# Patient Record
Sex: MALE | Race: WHITE | NOT HISPANIC OR LATINO | Employment: UNEMPLOYED | URBAN - METROPOLITAN AREA
[De-identification: names, ages, dates, MRNs, and addresses within clinical notes are randomized per-mention and may not be internally consistent; named-entity substitution may affect disease eponyms.]

---

## 2017-03-02 RX ORDER — ZOLPIDEM TARTRATE 5 MG/1
TABLET ORAL
Refills: 1 | OUTPATIENT
Start: 2017-03-02

## 2017-05-09 ENCOUNTER — OFFICE VISIT (OUTPATIENT)
Dept: NEUROLOGY | Facility: MEDICAL CENTER | Age: 47
End: 2017-05-09
Payer: MEDICAID

## 2017-05-09 VITALS
OXYGEN SATURATION: 98 % | WEIGHT: 165 LBS | DIASTOLIC BLOOD PRESSURE: 70 MMHG | TEMPERATURE: 99 F | HEART RATE: 76 BPM | HEIGHT: 73 IN | RESPIRATION RATE: 16 BRPM | BODY MASS INDEX: 21.87 KG/M2 | SYSTOLIC BLOOD PRESSURE: 96 MMHG

## 2017-05-09 DIAGNOSIS — G35 MULTIPLE SCLEROSIS (HCC): ICD-10-CM

## 2017-05-09 DIAGNOSIS — F51.01 PRIMARY INSOMNIA: ICD-10-CM

## 2017-05-09 DIAGNOSIS — E53.8 VITAMIN B12 DEFICIENCY: ICD-10-CM

## 2017-05-09 DIAGNOSIS — E55.9 VITAMIN D DEFICIENCY: ICD-10-CM

## 2017-05-09 PROCEDURE — 99214 OFFICE O/P EST MOD 30 MIN: CPT | Performed by: PSYCHIATRY & NEUROLOGY

## 2017-05-09 RX ORDER — ZOLPIDEM TARTRATE 10 MG/1
10 TABLET ORAL NIGHTLY PRN
Qty: 30 TAB | Refills: 5 | Status: SHIPPED | OUTPATIENT
Start: 2017-05-09 | End: 2019-07-30

## 2017-05-09 NOTE — MR AVS SNAPSHOT
"        Ross Lindsay   2017 11:20 AM   Office Visit   MRN: [id number]    Department:  [facility]   Dept Phone:  [phone]    Description:  Male : 1970   Provider:  Melissa P Bloch, M.D.           Reason for Visit     Multiple Sclerosis FU      Allergies as of 2017     No Known Allergies      You were diagnosed with     Multiple sclerosis (CMS-HCC)   [340.ICD-9-CM]       Primary insomnia   [306436]       Vitamin B12 deficiency   [398397]       Vitamin D deficiency   [9651765]         Vital Signs     Blood Pressure Pulse Temperature Respirations Height Weight    96/70 mmHg 76 37.2 °C (99 °F) 16 1.854 m (6' 0.99\") 74.844 kg (165 lb)    Body Mass Index Oxygen Saturation Smoking Status             21.77 kg/m2 98% Former Smoker         Basic Information     Date Of Birth Sex Race Ethnicity Preferred Language    1970 Male White Non- English      Your appointments     May 24, 2017  9:30 AM   IV BIOLOGIC with [facility] INF CHAIR 2   [facility] (--)    Outpatient Infusion Center  [address] [address]   [phone]            2017  1:20 PM   Follow Up Visit with Melissa P Bloch, M.D.   [facility] (--)    [address] [place] [address], [address] [address] [phone]           You will be receiving a confirmation call a few days before your appointment from our automated call confirmation system.              Problem List              ICD-10-CM Priority Class Noted - Resolved    Essential hypertension I10   3/27/2014 - Present    Multiple sclerosis (CMS-HCC) G35   2015 - Present    Vitamin D deficiency E55.9   2015 - Present    Vitamin B12 deficiency E53.8   2015 - Present    Insomnia G47.00   2015 - Present      Health Maintenance        Date Due Completion Dates    IMM DTaP/Tdap/Td Vaccine (2 - Td) 10/23/2024 10/23/2014            Current Immunizations     Influenza TIV (IM) 10/23/2014    Tdap " Vaccine 10/23/2014      Below and/or attached are the medications your provider expects you to take. Review all of your home medications and newly ordered medications with your provider and/or pharmacist. Follow medication instructions as directed by your provider and/or pharmacist. Please keep your medication list with you and share with your provider. Update the information when medications are discontinued, doses are changed, or new medications (including over-the-counter products) are added; and carry medication information at all times in the event of emergency situations     Allergies:  No Known Allergies          Medications  Valid as of: May 09, 2017 - 12:24 PM    Generic Name Brand Name Tablet Size Instructions for use    Citalopram Hydrobromide (Tab) CELEXA 20 MG Take 1 Tab by mouth every day.        Cyanocobalamin (Tab) vitamin B-12 1000 MCG TAKE ONE TABLET BY MOUTH DAILY        Dalfampridine (TABLET SR 12 HR) Dalfampridine 10 MG Take 10 mg by mouth 2 Times a Day.        Ergocalciferol (Cap) DRISDOL 94287 UNITS TAKE 1 CAPSULE BY MOUTH EVERY 7 DAYS        Ibuprofen (Tab) MOTRIN 200 MG Take 200 mg by mouth every 6 hours as needed.        Lisinopril (Tab) PRINIVIL 10 MG TAKE ONE TABLET BY MOUTH DAILY        Natalizumab (Conc) TYSABRI 300 MG/15ML 300 mg by Intravenous route. Indications: Relapsing, Remitting Multiple Sclerosis        Zolpidem Tartrate (Tab) AMBIEN 10 MG Take 1 Tab by mouth at bedtime as needed for Sleep.        .                 Medicines prescribed today were sent to:     [facility] #[id number] - [place], [place] - [address] [facility] [address]    [address] [facility] [address]    Phone: [phone] Fax: [phone]    Open 24 Hours?: No      Medication refill instructions:       If your prescription bottle indicates you have medication refills left, it is not necessary to call your provider’s office. Please contact your pharmacy and they will refill your medication.    If your prescription bottle  indicates you do not have any refills left, you may request refills at any time through one of the following ways: The online [employer] system (except Urgent Care), by calling your provider’s office, or by asking your pharmacy to contact your provider’s office with a refill request. Medication refills are processed only during regular business hours and may not be available until the next business day. Your provider may request additional information or to have a follow-up visit with you prior to refilling your medication.   *Please Note: Medication refills are assigned a new Rx number when refilled electronically. Your pharmacy may indicate that no refills were authorized even though a new prescription for the same medication is available at the pharmacy. Please request the medicine by name with the pharmacy before contacting your provider for a refill.           MyChart Status: Patient Declined

## 2017-05-09 NOTE — ASSESSMENT & PLAN NOTE
Pt notices that he feels better on his pedal assist bike with his balance. Pt states that he has some insomnia. Pt states that he experiences some insomnia and some fatigue. Pt has to force himself to move every day. Pt states that the morning is more difficult because of stiffness. Pt has some tremors toward the end of the month. Pt states he feels like he ready to get his inf[place] [facility]. Pt grew up in [place].

## 2017-05-09 NOTE — PROGRESS NOTES
Established Patient    Ross presents today with the following:    CC: Follow up visit    HPI:   , 46 year old male with past medical history significant for multiple sclerosis presents to the clinic for a follow up visit. He was last seen in the clinic in 8/16. Reports to be having occasional tremors when he is tired, which goes away by itself. Reports to be having mild episodes of confusion occasionally, denies short-term or long-term memory problems. Currently on tysabri , monthly infusions - last one on 4/24/17. Also takes Ampyra. Reports to be doing well otherwise. Has been taking Vitamin D and Vitamin B12 supplements regularly. Uses a cane to ambulate.    Patient Active Problem List    Diagnosis Date Noted   • Multiple sclerosis (CMS-[facility]) 09/08/2015   • Vitamin D deficiency 09/08/2015   • Vitamin B12 deficiency 09/08/2015   • Insomnia 09/08/2015   • Essential hypertension 03/27/2014       Current Outpatient Prescriptions   Medication Sig Dispense Refill   • zolpidem (AMBIEN) 10 MG Tab Take 1 Tab by mouth at bedtime as needed for Sleep. 30 Tab 5   • lisinopril (PRINIVIL) 10 MG Tab TAKE ONE TABLET BY MOUTH DAILY 90 Tab 2   • Cyanocobalamin (VITAMIN B-12) 1000 MCG Tab TAKE ONE TABLET BY MOUTH DAILY 100 Tab 6   • vitamin D, Ergocalciferol, (DRISDOL) 00443 UNITS Cap capsule TAKE 1 CAPSULE BY MOUTH EVERY 7 DAYS 4 Cap 10   • citalopram (CELEXA) 20 MG Tab Take 1 Tab by mouth every day. 90 Tab 3   • natalizumab (TYSABRI) 300 MG/15ML Conc 300 mg by Intravenous route. Indications: Relapsing, Remitting Multiple Sclerosis     • Dalfampridine (AMPYRA) 10 MG TABLET SR 12 HR Take 10 mg by mouth 2 Times a Day. 60 Tab 11   • ibuprofen (MOTRIN) 200 MG TABS Take 200 mg by mouth every 6 hours as needed.       No current facility-administered medications for this visit.       ROS: As per HPI. Additional pertinent symptoms as noted below.    All others negative    BP 96/70 mmHg  Pulse 76  Temp(Src) 37.2 °C (99 °F)   "Resp 16  Ht 1.854 m (6' 0.99\")  Wt 74.844 kg (165 lb)  BMI 21.77 kg/m2  SpO2 98%    Physical Exam   Constitutional:  oriented to person, place, and time. No distress.   Eyes: Pupils are equal, round, and reactive to light. No scleral icterus.  Neck: Neck supple. No thyromegaly present.   Cardiovascular: Normal rate, regular rhythm and normal heart sounds.  Exam reveals no gallop and no friction rub.  No murmur heard.  Pulmonary/Chest: Breath sounds normal. Chest wall is not dull to percussion.   Musculoskeletal:   no edema.   Lymphadenopathy: no cervical adenopathy  Neurological: alert and oriented to person, place, and time. cranial nerves II through XII intact, motor exam normal,sensory exam normal, deep tendon reflexes are 2+ and broad-based gait.  Skin: No cyanosis. Nails show no clubbing.      Assessment and Plan    Multiple sclerosis (CMS-[facility])  - Plan is to continue Tysabri and Ampyra as he is doing better.    Vitamin B12 deficiency  - continue taking supplements.    Vitamin D deficiency  - continue taking supplements.      Signed by: Alexia Steele M.D.    "

## 2017-06-22 ENCOUNTER — TELEPHONE (OUTPATIENT)
Dept: NEUROLOGY | Facility: MEDICAL CENTER | Age: 47
End: 2017-06-22

## 2017-06-22 DIAGNOSIS — G35 MULTIPLE SCLEROSIS (HCC): ICD-10-CM

## 2017-06-22 NOTE — TELEPHONE ENCOUNTER
Message: received a call from Abigail with [facility] infusion that they are not going to infuse patient with tysabri. Patient has been complaining of balance issues, extreme fatigue, dull headache that every go away, occassional tremor. Patient does say the heat does bother him. I advised patient to look into getting a cooling vest. [facility] would like the OK from Dr Bloch to proceed with the Tysabri    Caller: Abigail  Call Back #: [phone]  OK to leave detailed message: n

## 2017-06-22 NOTE — TELEPHONE ENCOUNTER
I would like patient to have MRI scan of the brain as he has not had one in some time prior to the Tysabri if possible. Thanks MB

## 2017-06-22 NOTE — TELEPHONE ENCOUNTER
RE: Bj Robledo  Received: Today       Melissa P Bloch, M.D.  Geraldo Mcdonald R.N.       Cc: Ronni Beltran Ass't                     I have ordered an MRI of the Brain which I would like to get ASAP and before he has the next infusion. Please let patient know and if he is feeling worse over the weekend he should go to the ER for evaluation. Thanks MB

## 2017-06-29 ENCOUNTER — APPOINTMENT (OUTPATIENT)
Dept: RADIOLOGY | Facility: MEDICAL CENTER | Age: 47
End: 2017-06-29
Attending: PSYCHIATRY & NEUROLOGY
Payer: MEDICAID

## 2017-07-07 ENCOUNTER — HOSPITAL ENCOUNTER (OUTPATIENT)
Dept: RADIOLOGY | Facility: MEDICAL CENTER | Age: 47
End: 2017-07-07
Attending: PSYCHIATRY & NEUROLOGY
Payer: MEDICAID

## 2017-07-07 DIAGNOSIS — G35 MULTIPLE SCLEROSIS (HCC): ICD-10-CM

## 2017-07-07 PROCEDURE — 700117 HCHG RX CONTRAST REV CODE 255: Performed by: PSYCHIATRY & NEUROLOGY

## 2017-07-07 PROCEDURE — 70553 MRI BRAIN STEM W/O & W/DYE: CPT

## 2017-07-07 PROCEDURE — A9579 GAD-BASE MR CONTRAST NOS,1ML: HCPCS | Performed by: PSYCHIATRY & NEUROLOGY

## 2017-07-07 RX ADMIN — GADODIAMIDE 17 ML: 287 INJECTION INTRAVENOUS at 15:23

## 2017-07-27 ENCOUNTER — TELEPHONE (OUTPATIENT)
Dept: NEUROLOGY | Facility: MEDICAL CENTER | Age: 47
End: 2017-07-27

## 2017-07-27 NOTE — TELEPHONE ENCOUNTER
Message: patient would like MRI results and if he can continue tysabri.    Caller: krupa  Call Back #: [phone]  OK to leave detailed message: n

## 2017-07-27 NOTE — TELEPHONE ENCOUNTER
Message: patient had the MRI done. Would you like him to resume tysabri? His last infusion date was 5/24/17.    Caller: Patty  Call Back #: [phone]  OK to leave detailed message: n

## 2017-08-01 NOTE — TELEPHONE ENCOUNTER
Ross Higueraelson   7/27/2017   Telephone   MRN:  [id number]    Description: 46 year old male   Provider: Sigrid Stroud, Med Ass't   Department: [facility]         Reason for Call      Misc. Infusion     Tysabri           Call Documentation      Melissa P Bloch, M.D. at 7/28/2017  4:06 PM      Status: Signed         Expand All Collapse All    Ye Mri is stable and pat can resume tysabri ASAP . THX MB                   Sigrid Stroud, Med Ass't at 7/27/2017  4:05 PM      Status: Signed         Expand All Collapse All    Message: patient had the MRI done. Would you like him to resume tysabri? His last infusion date was 5/24/17.    Caller: Patty  Call Back #: [phone]  OK to leave detailed message: n                    Spoke w/ Infusion center and advised them. They will get Ross scheduled to start Tysabri again.

## 2017-11-07 ENCOUNTER — OFFICE VISIT (OUTPATIENT)
Dept: NEUROLOGY | Facility: MEDICAL CENTER | Age: 47
End: 2017-11-07
Payer: MEDICARE

## 2017-11-07 VITALS
BODY MASS INDEX: 25.05 KG/M2 | WEIGHT: 189 LBS | HEIGHT: 73 IN | HEART RATE: 84 BPM | SYSTOLIC BLOOD PRESSURE: 98 MMHG | TEMPERATURE: 98.2 F | DIASTOLIC BLOOD PRESSURE: 60 MMHG | RESPIRATION RATE: 16 BRPM | OXYGEN SATURATION: 96 %

## 2017-11-07 DIAGNOSIS — G35 MULTIPLE SCLEROSIS (HCC): ICD-10-CM

## 2017-11-07 DIAGNOSIS — E55.9 VITAMIN D DEFICIENCY: ICD-10-CM

## 2017-11-07 PROCEDURE — 99214 OFFICE O/P EST MOD 30 MIN: CPT | Performed by: PSYCHIATRY & NEUROLOGY

## 2017-11-07 RX ORDER — GABAPENTIN 300 MG/1
300 CAPSULE ORAL 3 TIMES DAILY
Qty: 90 CAP | Refills: 11 | Status: SHIPPED | OUTPATIENT
Start: 2017-11-07 | End: 2018-05-14 | Stop reason: SDUPTHER

## 2017-11-07 ASSESSMENT — PATIENT HEALTH QUESTIONNAIRE - PHQ9: CLINICAL INTERPRETATION OF PHQ2 SCORE: 0

## 2017-11-10 ENCOUNTER — APPOINTMENT (OUTPATIENT)
Dept: RADIOLOGY | Facility: MEDICAL CENTER | Age: 47
End: 2017-11-10
Attending: PSYCHIATRY & NEUROLOGY
Payer: MEDICARE

## 2017-11-10 DIAGNOSIS — G35 MULTIPLE SCLEROSIS (HCC): ICD-10-CM

## 2017-11-10 PROCEDURE — A9579 GAD-BASE MR CONTRAST NOS,1ML: HCPCS | Performed by: PSYCHIATRY & NEUROLOGY

## 2017-11-10 PROCEDURE — 72156 MRI NECK SPINE W/O & W/DYE: CPT

## 2017-11-10 PROCEDURE — 700117 HCHG RX CONTRAST REV CODE 255: Performed by: PSYCHIATRY & NEUROLOGY

## 2017-11-10 RX ADMIN — GADODIAMIDE 20 ML: 287 INJECTION INTRAVENOUS at 15:06

## 2017-11-14 NOTE — ASSESSMENT & PLAN NOTE
Patient is been doing well on Tysabri. He is upset as his son was recently diagnosed with multiple sclerosis. Patient states he would like an opinion for his son and also that he was not aware was a genetic disease. Patient overall has been relatively stable.

## 2017-11-14 NOTE — PROGRESS NOTES
CHIEF COMPLAINT  Chief Complaint   Patient presents with   • Follow-Up     MS       HPI  Ross Lindsay is a 44 y.o. male who presents for treatment of multiple sclerosis with concern over worsening of his condition on his current treatment.  Multiple sclerosis  Patient is been doing well on Tysabri. He is upset as his son was recently diagnosed with multiple sclerosis. Patient states he would like an opinion for his son and also that he was not aware was a genetic disease. Patient overall has been relatively stable.    Vitamin D deficiency  Patient has been taking vitamin D supplementation.    REVIEW OF SYSTEMS  Pertinent Positives: Fatigue, memory changes, cognitive decline   All other systems are negative.     PAST MEDICAL HISTORY  Past Medical History:   Diagnosis Date   • Acid reflux    • Depression    • MS (multiple sclerosis) (CMS-[facility])        SOCIAL HISTORY  Social History     Social History   • Marital status: Single     Spouse name: N/A   • Number of children: N/A   • Years of education: N/A     Occupational History   • Not on file.     Social History Main Topics   • Smoking status: Former Smoker     Packs/day: 0.50     Years: 1.00     Types: Cigarettes   • Smokeless tobacco: Never Used   • Alcohol use No      Comment: occasionally   • Drug use: No   • Sexual activity: Not on file     Other Topics Concern   • Not on file     Social History Narrative   • No narrative on file       SURGICAL HISTORY  No past surgical history on file.    CURRENT MEDICATIONS  Current Outpatient Prescriptions   Medication Sig Dispense Refill   • gabapentin (NEURONTIN) 300 MG Cap Take 1 Cap by mouth 3 times a day. 90 Cap 11   • lisinopril (PRINIVIL) 10 MG Tab TAKE ONE TABLET BY MOUTH DAILY 90 Tab 0   • zolpidem (AMBIEN) 10 MG Tab Take 1 Tab by mouth at bedtime as needed for Sleep. 30 Tab 5   • Cyanocobalamin (VITAMIN B-12) 1000 MCG Tab TAKE ONE TABLET BY MOUTH DAILY 100 Tab 6   • vitamin D, Ergocalciferol, (DRISDOL) 17968  "UNITS Cap capsule TAKE 1 CAPSULE BY MOUTH EVERY 7 DAYS 4 Cap 10   • citalopram (CELEXA) 20 MG Tab Take 1 Tab by mouth every day. 90 Tab 3   • natalizumab (TYSABRI) 300 MG/15ML Conc 300 mg by Intravenous route. Indications: Relapsing, Remitting Multiple Sclerosis     • Dalfampridine (AMPYRA) 10 MG TABLET SR 12 HR Take 10 mg by mouth 2 Times a Day. 60 Tab 11   • ibuprofen (MOTRIN) 200 MG TABS Take 200 mg by mouth every 6 hours as needed.       No current facility-administered medications for this visit.        ALLERGIES  No Known Allergies    PHYSICAL EXAM  VITAL SIGNS: BP (!) 98/60   Pulse 84   Temp 36.8 °C (98.2 °F)   Resp 16   Ht 1.854 m (6' 1\")   Wt 85.7 kg (189 lb)   SpO2 96%   BMI 24.94 kg/m²   Constitutional: Well developed, Well nourished, No acute distress, Non-toxic appearance.   HENT: Normocephalic atraumatic  Eyes: Fundi discs sharp, pupils equally round and reactive to light  Neck: Normal range of motion, No tenderness, Supple, No stridor.   Cardiovascular: Normal heart rate, Normal rhythm, No murmurs, No rubs, No gallops.   Thorax & Lungs: Normal breath sounds, No respiratory distress, No wheezing, No chest tenderness.   Abdomen: Bowel sounds normal, Soft, No tenderness, No masses, No pulsatile masses.   Skin: Warm, Dry, No erythema, No rash.   Back: No tenderness, No CVA tenderness.   Extremities: Intact distal pulses, No edema, No tenderness, No cyanosis, No clubbing.   Neurologic: Alert and oriented to person place and time, cranial nerves II through XII nystagmus with horizontal gaze, motor exam patient has a spastic paraparesis, sensory exam altered sensation in his arms and his legs bilaterally, DTRs are 3+ throughout patient cannot tandem has a broad-based gait gait, and 25 foot times walk test is 16.6 seconds.  Psychiatric: Affect normal, Judgment normal, Mood normal.   Lab: Reviewed with patient in detail and as noted in results.        RADIOLOGY/PROCEDURES  I reviewed patient's MRI scan " of the brain and the cervical spine in the PAC system but the patient.    ASSESSMENT AND PLAN  1. Multiple sclerosis  Plan to continue Tysabri for this patient as this patient is doing better. This patient continue ampyra.    - ergocalciferol (DRISDOL) 86379 UNIT capsule; Take 1 Cap by mouth every 7 days.  Dispense: 4 Cap; Refill: 11  - Dalfampridine (AMPYRA) 10 MG TABLET SR 12 HR; Take 10 mg by mouth 2 Times a Day.  Dispense: 60 Tab; Refill: 11    2. Vitamin D deficiency  Supplement with 50,000 international units every week         I spent 30 minutes with this patient, over fifty percent was spent counseling patient on their condition, best management practices, reviewing test results and risks and benefits of treatmen

## 2018-05-14 ENCOUNTER — OFFICE VISIT (OUTPATIENT)
Dept: NEUROLOGY | Facility: MEDICAL CENTER | Age: 48
End: 2018-05-14
Payer: MEDICARE

## 2018-05-14 VITALS
DIASTOLIC BLOOD PRESSURE: 68 MMHG | WEIGHT: 194 LBS | SYSTOLIC BLOOD PRESSURE: 118 MMHG | TEMPERATURE: 98 F | HEART RATE: 76 BPM | BODY MASS INDEX: 25.71 KG/M2 | HEIGHT: 73 IN | OXYGEN SATURATION: 95 %

## 2018-05-14 DIAGNOSIS — E55.9 VITAMIN D DEFICIENCY: ICD-10-CM

## 2018-05-14 DIAGNOSIS — G35 MULTIPLE SCLEROSIS (HCC): ICD-10-CM

## 2018-05-14 PROCEDURE — 99214 OFFICE O/P EST MOD 30 MIN: CPT | Performed by: PSYCHIATRY & NEUROLOGY

## 2018-05-14 RX ORDER — GABAPENTIN 300 MG/1
300 CAPSULE ORAL 3 TIMES DAILY
Qty: 90 CAP | Refills: 11 | Status: SHIPPED | OUTPATIENT
Start: 2018-05-14 | End: 2019-06-12 | Stop reason: SDUPTHER

## 2018-05-14 NOTE — ASSESSMENT & PLAN NOTE
Pt is on the ampyra 10mg and it has helped his walking speed. Pt is also on gabapentin for for paresthesias. Pt states he still does not feel his legs well and he trips easily.

## 2018-05-14 NOTE — PROGRESS NOTES
CHIEF COMPLAINT  Chief Complaint   Patient presents with   • Follow-Up     MS       HPI  Ross Lindsay is a 44 y.o. male who presents for treatment of multiple sclerosis with concern over worsening of his condition on his current treatment.  Multiple sclerosis  Pt is on the ampyra 10mg and it has helped his walking speed. Pt is also on gabapentin for for paresthesias. Pt states he still does not feel his legs well and he trips easily.    Vitamin D deficiency  Pt states that he is taking a once a week dose.    REVIEW OF SYSTEMS  Pertinent Positives: Fatigue, memory changes, cognitive decline   All other systems are negative.     PAST MEDICAL HISTORY  Past Medical History:   Diagnosis Date   • Acid reflux    • Depression    • MS (multiple sclerosis) ([facility])        SOCIAL HISTORY  Social History     Social History   • Marital status: Single     Spouse name: N/A   • Number of children: N/A   • Years of education: N/A     Occupational History   • Not on file.     Social History Main Topics   • Smoking status: Former Smoker     Packs/day: 0.50     Years: 1.00     Types: Cigarettes   • Smokeless tobacco: Never Used   • Alcohol use No      Comment: occasionally   • Drug use: No   • Sexual activity: Not on file     Other Topics Concern   • Not on file     Social History Narrative   • No narrative on file       SURGICAL HISTORY  History reviewed. No pertinent surgical history.    CURRENT MEDICATIONS  Current Outpatient Prescriptions   Medication Sig Dispense Refill   • gabapentin (NEURONTIN) 300 MG Cap Take 1 Cap by mouth 3 times a day. 90 Cap 11   • lisinopril (PRINIVIL) 10 MG Tab Take 1 tablet by mouth every day 90 Tab 3   • citalopram (CELEXA) 20 MG Tab Take 1 Tab by mouth every day. 90 Tab 3   • zolpidem (AMBIEN) 10 MG Tab Take 1 Tab by mouth at bedtime as needed for Sleep. 30 Tab 5   • Cyanocobalamin (VITAMIN B-12) 1000 MCG Tab TAKE ONE TABLET BY MOUTH DAILY 100 Tab 6   • vitamin D, Ergocalciferol, (DRISDOL) 36455  "UNITS Cap capsule TAKE 1 CAPSULE BY MOUTH EVERY 7 DAYS 4 Cap 10   • natalizumab (TYSABRI) 300 MG/15ML Conc 300 mg by Intravenous route. Indications: Relapsing, Remitting Multiple Sclerosis     • Dalfampridine (AMPYRA) 10 MG TABLET SR 12 HR Take 10 mg by mouth 2 Times a Day. 60 Tab 11   • ibuprofen (MOTRIN) 200 MG TABS Take 200 mg by mouth every 6 hours as needed.       No current facility-administered medications for this visit.        ALLERGIES  No Known Allergies    PHYSICAL EXAM  VITAL SIGNS: /68   Pulse 76   Temp 36.7 °C (98 °F)   Ht 1.854 m (6' 1\")   Wt 88 kg (194 lb)   SpO2 95%   BMI 25.60 kg/m²   Constitutional: Well developed, Well nourished, No acute distress, Non-toxic appearance.   HENT: Normocephalic atraumatic  Eyes: Fundi discs sharp, pupils equally round and reactive to light  Neck: Normal range of motion, No tenderness, Supple, No stridor.   Cardiovascular: Normal heart rate, Normal rhythm, No murmurs, No rubs, No gallops.   Thorax & Lungs: Normal breath sounds, No respiratory distress, No wheezing, No chest tenderness.   Abdomen: Bowel sounds normal, Soft, No tenderness, No masses, No pulsatile masses.   Skin: Warm, Dry, No erythema, No rash.   Back: No tenderness, No CVA tenderness.   Extremities: Intact distal pulses, No edema, No tenderness, No cyanosis, No clubbing.   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   Lab: Reviewed with patient in detail and as noted in results.        RADIOLOGY/PROCEDURES  I reviewed patient's MRI scan of the brain and the cervical spine in the PAC system but the " patient.    ASSESSMENT AND PLAN  1. Multiple sclerosis  Plan to continue Tysabri for this patient as this patient is doing better. This patient continue ampyra. Plan to check JCV status.    - ergocalciferol (DRISDOL) 16024 UNIT capsule; Take 1 Cap by mouth every 7 days.  Dispense: 4 Cap; Refill: 11  - Dalfampridine (AMPYRA) 10 MG TABLET SR 12 HR; Take 10 mg by mouth 2 Times a Day.  Dispense: 60 Tab; Refill: 11    2. Vitamin D deficiency  Supplement with 50,000 international units every week       I spent 30 minutes with this patient face to face, over fifty percent was spent counseling patient on their condition, best management practices, reviewing test results and risks and benefits of treatmen

## 2018-05-31 ENCOUNTER — OFFICE VISIT (OUTPATIENT)
Dept: NEUROLOGY | Facility: MEDICAL CENTER | Age: 48
End: 2018-05-31
Payer: MEDICARE

## 2018-05-31 VITALS
TEMPERATURE: 98.3 F | HEIGHT: 73 IN | OXYGEN SATURATION: 96 % | SYSTOLIC BLOOD PRESSURE: 118 MMHG | WEIGHT: 182 LBS | BODY MASS INDEX: 24.12 KG/M2 | HEART RATE: 76 BPM | DIASTOLIC BLOOD PRESSURE: 76 MMHG

## 2018-05-31 DIAGNOSIS — N39.0 URINARY TRACT INFECTION WITHOUT HEMATURIA, SITE UNSPECIFIED: ICD-10-CM

## 2018-05-31 DIAGNOSIS — R33.9 URINARY RETENTION: ICD-10-CM

## 2018-05-31 DIAGNOSIS — G35 MULTIPLE SCLEROSIS (HCC): ICD-10-CM

## 2018-05-31 PROCEDURE — 99214 OFFICE O/P EST MOD 30 MIN: CPT | Performed by: PSYCHIATRY & NEUROLOGY

## 2018-06-01 NOTE — PROGRESS NOTES
CHIEF COMPLAINT  Chief Complaint   Patient presents with   • Follow-Up     has had many falls / MS       HPI  Ross Lindsay is a 44 y.o. male who presents for treatment of multiple sclerosis with concern over worsening of his condition on his current treatment.  Multiple sclerosis  Patient went to the [place] and got very overheated and dehydrated and developed a urinary tract infection. I believe these symptoms has exacerbated his multiple sclerosis. He is being taken care of by his father now who is paying close attention to his ability to stay cool the summer up at [facility]. Patient's gait is off and is having more frequent falls. Patient has had some slurring of his speech and there is a concern for worsening of his MS.    REVIEW OF SYSTEMS  Pertinent Positives: Fatigue, memory changes, cognitive decline   All other systems are negative.     PAST MEDICAL HISTORY  Past Medical History:   Diagnosis Date   • Acid reflux    • Depression    • MS (multiple sclerosis) ([facility])        SOCIAL HISTORY  Social History     Social History   • Marital status: Single     Spouse name: N/A   • Number of children: N/A   • Years of education: N/A     Occupational History   • Not on file.     Social History Main Topics   • Smoking status: Former Smoker     Packs/day: 0.50     Years: 1.00     Types: Cigarettes   • Smokeless tobacco: Never Used   • Alcohol use No      Comment: occasionally   • Drug use: No   • Sexual activity: Not on file     Other Topics Concern   • Not on file     Social History Narrative   • No narrative on file       SURGICAL HISTORY  History reviewed. No pertinent surgical history.    CURRENT MEDICATIONS  Current Outpatient Prescriptions   Medication Sig Dispense Refill   • gabapentin (NEURONTIN) 300 MG Cap Take 1 Cap by mouth 3 times a day. 90 Cap 11   • lisinopril (PRINIVIL) 10 MG Tab Take 1 tablet by mouth every day 90 Tab 3   • citalopram (CELEXA) 20 MG Tab Take 1 Tab by mouth every day. 90 Tab  "3   • zolpidem (AMBIEN) 10 MG Tab Take 1 Tab by mouth at bedtime as needed for Sleep. 30 Tab 5   • Cyanocobalamin (VITAMIN B-12) 1000 MCG Tab TAKE ONE TABLET BY MOUTH DAILY 100 Tab 6   • vitamin D, Ergocalciferol, (DRISDOL) 87816 UNITS Cap capsule TAKE 1 CAPSULE BY MOUTH EVERY 7 DAYS 4 Cap 10   • natalizumab (TYSABRI) 300 MG/15ML Conc 300 mg by Intravenous route. Indications: Relapsing, Remitting Multiple Sclerosis     • Dalfampridine (AMPYRA) 10 MG TABLET SR 12 HR Take 10 mg by mouth 2 Times a Day. 60 Tab 11   • ibuprofen (MOTRIN) 200 MG TABS Take 200 mg by mouth every 6 hours as needed.       No current facility-administered medications for this visit.        ALLERGIES  No Known Allergies    PHYSICAL EXAM  VITAL SIGNS: /76   Pulse 76   Temp 36.8 °C (98.3 °F)   Ht 1.854 m (6' 1\")   Wt 82.6 kg (182 lb)   SpO2 96%   BMI 24.01 kg/m²   Constitutional: Well developed, Well nourished, No acute distress, Non-toxic appearance.   HENT: Normocephalic atraumatic  Eyes: Fundi discs sharp, pupils equally round and reactive to light  Neck: Normal range of motion, No tenderness, Supple, No stridor.   Cardiovascular: Normal heart rate, Normal rhythm, No murmurs, No rubs, No gallops.   Thorax & Lungs: Normal breath sounds, No respiratory distress, No wheezing, No chest tenderness.   Abdomen: Bowel sounds normal, Soft, No tenderness, No masses, No pulsatile masses.   Skin: Warm, Dry, No erythema, No rash.   Back: No tenderness, No CVA tenderness.   Extremities: Intact distal pulses, No edema, No tenderness, No cyanosis, No clubbing.   Neurologic: Alert and oriented to person place and time, cranial nerves II through XII nystagmus with horizontal gaze, motor exam patient has a spastic paraparesis, sensory exam altered sensation in his arms and his legs bilaterally, DTRs are 3+ throughout patient cannot tandem has a broad-based gait gait, and 25 foot times walk test is 15.2 seconds, edss 5.0  Psychiatric: Affect normal, " Judgment normal, Mood normal.   Lab: Reviewed with patient in detail and as noted in results.        RADIOLOGY/PROCEDURES  I reviewed patient's MRI scan of the brain and the cervical spine in the PAC system with the patient.    ASSESSMENT AND PLAN  1. Multiple sclerosis  Plan to continue Tysabri for this patient and advised him the importance of keeping cool to maintain his balance and improve his symptoms. This patient continue ampyra. Plan to check JCV status.    - ergocalciferol (DRISDOL) 54151 UNIT capsule; Take 1 Cap by mouth every 7 days.  Dispense: 4 Cap; Refill: 11  - Dalfampridine (AMPYRA) 10 MG TABLET SR 12 HR; Take 10 mg by mouth 2 Times a Day.  Dispense: 60 Tab; Refill: 11    2. Urinary retention  Referred to urology for assessment of his urinary retention and medical treatment. Order urinalysis to determine if he has ongoing infection.     I spent 30 minutes with this patient and his father face to face, over fifty percent was spent counseling patient on their condition, best management practices, reviewing test results and risks and benefits of treatmen

## 2018-06-01 NOTE — ASSESSMENT & PLAN NOTE
Patient went to the [place] and got very overheated and dehydrated and developed a urinary tract infection. I believe these symptoms has exacerbated his multiple sclerosis. He is being taken care of by his father now who is paying close attention to his ability to stay cool the summer up at [facility]. Patient's gait is off and is having more frequent falls. Patient has had some slurring of his speech and there is a concern for worsening of his MS.

## 2018-08-20 ENCOUNTER — TELEPHONE (OUTPATIENT)
Dept: NEUROLOGY | Facility: MEDICAL CENTER | Age: 48
End: 2018-08-20

## 2018-08-20 NOTE — TELEPHONE ENCOUNTER
[employer]. Phone number [phone]    ID [id number]   Group # [id number]    To start auth for Ampyra

## 2018-08-20 NOTE — TELEPHONE ENCOUNTER
Pt called states he changed his insurance to [employer] and will need new authorization for his Ampyra medication and to resend to [facility]. Advised pt we will need his insurance information to start prior authorization. Agreed he will bring the card on wednesday

## 2018-11-19 ENCOUNTER — OFFICE VISIT (OUTPATIENT)
Dept: NEUROLOGY | Facility: MEDICAL CENTER | Age: 48
End: 2018-11-19
Payer: MEDICARE

## 2018-11-19 VITALS
WEIGHT: 191 LBS | OXYGEN SATURATION: 95 % | DIASTOLIC BLOOD PRESSURE: 72 MMHG | HEART RATE: 72 BPM | SYSTOLIC BLOOD PRESSURE: 116 MMHG | BODY MASS INDEX: 25.31 KG/M2 | HEIGHT: 73 IN | TEMPERATURE: 99.1 F

## 2018-11-19 DIAGNOSIS — G35 MULTIPLE SCLEROSIS (HCC): ICD-10-CM

## 2018-11-19 PROCEDURE — 99214 OFFICE O/P EST MOD 30 MIN: CPT | Performed by: PSYCHIATRY & NEUROLOGY

## 2018-11-19 NOTE — ASSESSMENT & PLAN NOTE
Pt states that he feels good.  Pt is taking tysabri which has kept his symptoms stable. Pt states that feels better now that it is cooler. Pt states that he has some fatigue. Pt states he is riding a peddle assist bicycle. Pt states he gets some exercise regularly and is hoping to maintain his physical disability.

## 2018-11-26 NOTE — PROGRESS NOTES
CHIEF COMPLAINT  Chief Complaint   Patient presents with   • Follow-Up     MS       HPI  Ross Lindsay is a 44 y.o. male who presents for treatment of multiple sclerosis with concern over worsening of his condition on his current treatment.  Multiple sclerosis  Pt states that he feels good.  Pt is taking tysabri which has kept his symptoms stable. Pt states that feels better now that it is cooler. Pt states that he has some fatigue. Pt states he is riding a peddle assist bicycle. Pt states he gets some exercise regularly and is hoping to maintain his physical disability.    REVIEW OF SYSTEMS  Pertinent Positives: Fatigue, memory changes, cognitive decline   All other systems are negative.     PAST MEDICAL HISTORY  Past Medical History:   Diagnosis Date   • Acid reflux    • Depression    • MS (multiple sclerosis) ([facility])        SOCIAL HISTORY  Social History     Social History   • Marital status: Single     Spouse name: N/A   • Number of children: N/A   • Years of education: N/A     Occupational History   • Not on file.     Social History Main Topics   • Smoking status: Former Smoker     Packs/day: 0.50     Years: 1.00     Types: Cigarettes   • Smokeless tobacco: Never Used   • Alcohol use No      Comment: occasionally   • Drug use: No   • Sexual activity: Not on file     Other Topics Concern   • Not on file     Social History Narrative   • No narrative on file       SURGICAL HISTORY  History reviewed. No pertinent surgical history.    CURRENT MEDICATIONS  Current Outpatient Prescriptions   Medication Sig Dispense Refill   • Colchicine 0.6 MG Cap Take  by mouth.     • indomethacin (INDOCIN) 25 MG Cap Take 25 mg by mouth 3 times a day.     • tamsulosin (FLOMAX) 0.4 MG capsule Take 0.4 mg by mouth every day.     • gabapentin (NEURONTIN) 300 MG Cap Take 1 Cap by mouth 3 times a day. 90 Cap 11   • lisinopril (PRINIVIL) 10 MG Tab Take 1 tablet by mouth every day 90 Tab 3   • citalopram (CELEXA) 20 MG Tab Take 1 Tab  "by mouth every day. 90 Tab 3   • zolpidem (AMBIEN) 10 MG Tab Take 1 Tab by mouth at bedtime as needed for Sleep. 30 Tab 5   • Cyanocobalamin (VITAMIN B-12) 1000 MCG Tab TAKE ONE TABLET BY MOUTH DAILY 100 Tab 6   • vitamin D, Ergocalciferol, (DRISDOL) 41700 UNITS Cap capsule TAKE 1 CAPSULE BY MOUTH EVERY 7 DAYS 4 Cap 10   • natalizumab (TYSABRI) 300 MG/15ML Conc 300 mg by Intravenous route. Indications: Relapsing, Remitting Multiple Sclerosis     • Dalfampridine (AMPYRA) 10 MG TABLET SR 12 HR Take 10 mg by mouth 2 Times a Day. 60 Tab 11   • ibuprofen (MOTRIN) 200 MG TABS Take 200 mg by mouth every 6 hours as needed.       No current facility-administered medications for this visit.        ALLERGIES  No Known Allergies    PHYSICAL EXAM  VITAL SIGNS: /72 (BP Location: Left arm, Patient Position: Sitting, BP Cuff Size: Adult)   Pulse 72   Temp 37.3 °C (99.1 °F) (Temporal)   Ht 1.854 m (6' 1\")   Wt 86.6 kg (191 lb)   SpO2 95%   BMI 25.20 kg/m²   Constitutional: Well developed, Well nourished, No acute distress, Non-toxic appearance.   HENT: Normocephalic atraumatic  Eyes: Fundi discs sharp, pupils equally round and reactive to light  Neck: Normal range of motion, No tenderness, Supple, No stridor.   Cardiovascular: Normal heart rate, Normal rhythm, No murmurs, No rubs, No gallops.   Thorax & Lungs: Normal breath sounds, No respiratory distress, No wheezing, No chest tenderness.   Abdomen: Bowel sounds normal, Soft, No tenderness, No masses, No pulsatile masses.   Skin: Warm, Dry, No erythema, No rash.   Back: No tenderness, No CVA tenderness.   Extremities: Intact distal pulses, No edema, No tenderness, No cyanosis, No clubbing.   Neurologic: Alert and oriented to person place and time, cranial nerves II through XII nystagmus with horizontal gaze, motor exam patient has a spastic paraparesis, sensory exam altered sensation in his arms and his legs bilaterally, DTRs are 3+ throughout patient cannot tandem has " a broad-based gait gait, and 25 foot times walk test is 15.2 seconds, edss 5.0  Psychiatric: Affect normal, Judgment normal, Mood normal.   Lab: Reviewed with patient in detail and as noted in results.        RADIOLOGY/PROCEDURES  I reviewed patient's MRI scan of the brain and the cervical spine in the PAC system with the patient.    ASSESSMENT AND PLAN  1. Multiple sclerosis  Plan to continue Tysabri for this patient and advised him the importance of keeping cool to maintain his balance and improve his symptoms. This patient continue ampyra. Plan to check JCV status.    - ergocalciferol (DRISDOL) 16256 UNIT capsule; Take 1 Cap by mouth every 7 days.  Dispense: 4 Cap; Refill: 11  - Dalfampridine (AMPYRA) 10 MG TABLET SR 12 HR; Take 10 mg by mouth 2 Times a Day.  Dispense: 60 Tab; Refill: 11         I spent 30 minutes with this patient  face to face, over fifty percent was spent counseling patient on their condition, best management practices, reviewing test results and risks and benefits of treatmen

## 2019-05-13 ENCOUNTER — HOSPITAL ENCOUNTER (OUTPATIENT)
Dept: LAB | Facility: MEDICAL CENTER | Age: 49
End: 2019-05-13
Attending: PSYCHIATRY & NEUROLOGY
Payer: MEDICARE

## 2019-05-13 ENCOUNTER — OFFICE VISIT (OUTPATIENT)
Dept: NEUROLOGY | Facility: MEDICAL CENTER | Age: 49
End: 2019-05-13
Payer: MEDICARE

## 2019-05-13 VITALS
OXYGEN SATURATION: 97 % | WEIGHT: 183 LBS | SYSTOLIC BLOOD PRESSURE: 112 MMHG | TEMPERATURE: 97.6 F | BODY MASS INDEX: 24.25 KG/M2 | DIASTOLIC BLOOD PRESSURE: 78 MMHG | HEART RATE: 80 BPM | HEIGHT: 73 IN

## 2019-05-13 DIAGNOSIS — N39.0 URINARY TRACT INFECTION WITHOUT HEMATURIA, SITE UNSPECIFIED: ICD-10-CM

## 2019-05-13 DIAGNOSIS — G35 MULTIPLE SCLEROSIS (HCC): ICD-10-CM

## 2019-05-13 LAB
ALBUMIN SERPL BCP-MCNC: 4.5 G/DL (ref 3.2–4.9)
ALBUMIN/GLOB SERPL: 2.3 G/DL
ALP SERPL-CCNC: 68 U/L (ref 30–99)
ALT SERPL-CCNC: 13 U/L (ref 2–50)
ANION GAP SERPL CALC-SCNC: 8 MMOL/L (ref 0–11.9)
AST SERPL-CCNC: 20 U/L (ref 12–45)
BASOPHILS # BLD AUTO: 0.7 % (ref 0–1.8)
BASOPHILS # BLD: 0.05 K/UL (ref 0–0.12)
BILIRUB SERPL-MCNC: 1.3 MG/DL (ref 0.1–1.5)
BUN SERPL-MCNC: 11 MG/DL (ref 8–22)
CALCIUM SERPL-MCNC: 9 MG/DL (ref 8.5–10.5)
CHLORIDE SERPL-SCNC: 106 MMOL/L (ref 96–112)
CO2 SERPL-SCNC: 22 MMOL/L (ref 20–33)
CREAT SERPL-MCNC: 0.87 MG/DL (ref 0.5–1.4)
EOSINOPHIL # BLD AUTO: 0.14 K/UL (ref 0–0.51)
EOSINOPHIL NFR BLD: 1.9 % (ref 0–6.9)
ERYTHROCYTE [DISTWIDTH] IN BLOOD BY AUTOMATED COUNT: 42.2 FL (ref 35.9–50)
GLOBULIN SER CALC-MCNC: 2 G/DL (ref 1.9–3.5)
GLUCOSE SERPL-MCNC: 77 MG/DL (ref 65–99)
HCT VFR BLD AUTO: 41.4 % (ref 42–52)
HGB BLD-MCNC: 14.4 G/DL (ref 14–18)
IMM GRANULOCYTES # BLD AUTO: 0.06 K/UL (ref 0–0.11)
IMM GRANULOCYTES NFR BLD AUTO: 0.8 % (ref 0–0.9)
LYMPHOCYTES # BLD AUTO: 2.54 K/UL (ref 1–4.8)
LYMPHOCYTES NFR BLD: 35.1 % (ref 22–41)
MCH RBC QN AUTO: 29 PG (ref 27–33)
MCHC RBC AUTO-ENTMCNC: 34.8 G/DL (ref 33.7–35.3)
MCV RBC AUTO: 83.3 FL (ref 81.4–97.8)
MONOCYTES # BLD AUTO: 0.61 K/UL (ref 0–0.85)
MONOCYTES NFR BLD AUTO: 8.4 % (ref 0–13.4)
NEUTROPHILS # BLD AUTO: 3.83 K/UL (ref 1.82–7.42)
NEUTROPHILS NFR BLD: 53.1 % (ref 44–72)
NRBC # BLD AUTO: 0.04 K/UL
NRBC BLD-RTO: 0.6 /100 WBC
PLATELET # BLD AUTO: 195 K/UL (ref 164–446)
PMV BLD AUTO: 10.6 FL (ref 9–12.9)
POTASSIUM SERPL-SCNC: 4.3 MMOL/L (ref 3.6–5.5)
PROT SERPL-MCNC: 6.5 G/DL (ref 6–8.2)
RBC # BLD AUTO: 4.97 M/UL (ref 4.7–6.1)
SODIUM SERPL-SCNC: 136 MMOL/L (ref 135–145)
WBC # BLD AUTO: 7.2 K/UL (ref 4.8–10.8)

## 2019-05-13 PROCEDURE — 86787 VARICELLA-ZOSTER ANTIBODY: CPT

## 2019-05-13 PROCEDURE — 88187 FLOWCYTOMETRY/READ 2-8: CPT | Mod: GA

## 2019-05-13 PROCEDURE — 80053 COMPREHEN METABOLIC PANEL: CPT

## 2019-05-13 PROCEDURE — 88184 FLOWCYTOMETRY/ TC 1 MARKER: CPT | Mod: GA

## 2019-05-13 PROCEDURE — 82784 ASSAY IGA/IGD/IGG/IGM EACH: CPT

## 2019-05-13 PROCEDURE — 80074 ACUTE HEPATITIS PANEL: CPT | Mod: GA

## 2019-05-13 PROCEDURE — 88185 FLOWCYTOMETRY/TC ADD-ON: CPT | Mod: GA

## 2019-05-13 PROCEDURE — 36415 COLL VENOUS BLD VENIPUNCTURE: CPT

## 2019-05-13 PROCEDURE — 99215 OFFICE O/P EST HI 40 MIN: CPT | Performed by: PSYCHIATRY & NEUROLOGY

## 2019-05-13 PROCEDURE — 85025 COMPLETE CBC W/AUTO DIFF WBC: CPT

## 2019-05-13 PROCEDURE — 86480 TB TEST CELL IMMUN MEASURE: CPT

## 2019-05-13 ASSESSMENT — PATIENT HEALTH QUESTIONNAIRE - PHQ9: CLINICAL INTERPRETATION OF PHQ2 SCORE: 0

## 2019-05-13 NOTE — PROGRESS NOTES
CC: Multiple Sclerosis       HPI:    Ross Lindsay is a pleasant 48 y.o. male who presents today in neurologic follow up for multiple sclerosis. The patient is currently being treated with Tysabri. He denies any side effects from the medication. His last MICHELET Virus was done in November 2018.  He was started on Tysabri despite being MICHELET virus antibody positive.  He has now been on the medication for at least 3 years.  The most recent MICHELET virus antibody index available in our system reports an index of 3.03.    Mr. Lindsay denies any new symptoms suggestive of MS relapse.  He suffers from left side weakness and numbness, in addition to gait imbalance, and cognitive changes. His son suffers from MS as well and they live together.      MS History:  Diagnosed: 2002  Lumbar puncture:  Yes OCB positive   First presentation: Tingling in bilateral hands and feet.   Disease modifying treatment:    Current: Tysabri since 2016   Previous: Tecfidera   Former or other neurologist: Dr. Melissa Bloch  Last attack: March 2015, Headache, Diplopia.    Last MRI: July 2017  MICHELET virus antibody: Positive, index of 3.03 from June 1, 2018    ROS:   Comprehensive review of systems was performed and was negative except for that mentioned in the HPI.    Past Medical History:   Past Medical History:   Diagnosis Date   • Acid reflux    • Depression    • MS (multiple sclerosis) ([facility])        Past Surgical History: History reviewed. No pertinent surgical history.    Social History:   Social History     Social History   • Marital status: Single     Spouse name: N/A   • Number of children: N/A   • Years of education: N/A     Occupational History   • Not on file.     Social History Main Topics   • Smoking status: Former Smoker     Packs/day: 0.50     Years: 1.00     Types: Cigarettes   • Smokeless tobacco: Never Used   • Alcohol use No      Comment: occasionally   • Drug use: No   • Sexual activity: Not on file     Other Topics Concern   • Not on  file     Social History Narrative   • No narrative on file       Family Hx:   Family History   Problem Relation Age of Onset   • Cancer Mother         lung   • Diabetes Mother    • Heart Disease Mother    • Hypertension Mother        Current Medications:   Current Outpatient Prescriptions:   •  citalopram (CELEXA) 20 MG Tab, Take 1 Tab by mouth every day., Disp: 90 Tab, Rfl: 1  •  lisinopril (PRINIVIL) 10 MG Tab, Take 1 tablet by mouth every day, Disp: 90 Tab, Rfl: 1  •  tamsulosin (FLOMAX) 0.4 MG capsule, Take 0.4 mg by mouth every day., Disp: , Rfl:   •  gabapentin (NEURONTIN) 300 MG Cap, Take 1 Cap by mouth 3 times a day., Disp: 90 Cap, Rfl: 11  •  zolpidem (AMBIEN) 10 MG Tab, Take 1 Tab by mouth at bedtime as needed for Sleep., Disp: 30 Tab, Rfl: 5  •  Cyanocobalamin (VITAMIN B-12) 1000 MCG Tab, TAKE ONE TABLET BY MOUTH DAILY, Disp: 100 Tab, Rfl: 6  •  vitamin D, Ergocalciferol, (DRISDOL) 88006 UNITS Cap capsule, TAKE 1 CAPSULE BY MOUTH EVERY 7 DAYS, Disp: 4 Cap, Rfl: 10  •  natalizumab (TYSABRI) 300 MG/15ML Conc, 300 mg by Intravenous route. Indications: Relapsing, Remitting Multiple Sclerosis, Disp: , Rfl:   •  Dalfampridine (AMPYRA) 10 MG TABLET SR 12 HR, Take 10 mg by mouth 2 Times a Day., Disp: 60 Tab, Rfl: 11  •  carvedilol (COREG) 6.25 MG Tab, Take 6.25 mg by mouth 2 times a day, with meals., Disp: , Rfl:   •  polyethylene glycol/lytes (MIRALAX) Pack, Take 17 g by mouth as needed., Disp: , Rfl:     Allergies: No Known Allergies      Physical Exam:   Vitals:    05/13/19 1434   BP: 112/78   Pulse: 80   Temp: 36.4 °C (97.6 °F)   SpO2: 97%       Constitutional: Well-developed, well-nourished, good hygiene. Appears stated age.  Cardiovascular: RRR, with no murmurs, rubs or gallops. No carotid bruits. No peripheral edema, pedal pulses intact.   Respiratory: Lungs CTA B/L, no W/R/R.   Skin: Warm, dry, intact. No rashes observed.  Eyes: Sclera anicteric.  Neurologic:   Mental Status: Awake, alert, oriented x  3.   Speech: Fluent with normal prosody.   Memory: Able to recall recent and remote events accurately.    Concentration: Attentive. Able to focus on history and follow multi-step commands.   Fund of Knowledge: Appropriate.   Cranial Nerves:    CN II: PERRL. No afferent pupillary defect.    CN III, IV, VI: Good eye closure, Right ANA    CN V: Decreased left facial sensation.    CN VII: No facial asymmetry.     CN VIII: Hearing intact.     CN IX and X: Palate elevates symmetrically. Normal gag reflex.    CN XI: Symmetric shoulder shrug.     CN XII: Tongue midline.    Sensory: Decreased sensation - temperature to the upper thighs.    Coordination: No evidence of past-pointing on finger to nose testing, no dysdiadochokinesia. Heel to shin intact.    DTR's: 3+ in the arms, knees, 4+ in the ankles. w nonsustained clonus.   Babinski: Toes mute bilaterally.   Romberg: Negative.   Movements: No tremors observed.   Musculoskeletal:    Strength:  Deltoids      R 5/5 L 5/5  Biceps        R 5/5 L 5/5  Triceps       R 5/5 L 5/5  Wrist Ext    R 5/5 L 5/5  Finger Flex R 5/5 L 5/5  Finger Ext   R 5/5 L 5/5  Hip Flex      R 5/5 L 5/5  Knee Flex   R 5/5 L 5/5  Knee Ext    R 5/5 L 5/5  Ankle DF    R 5/5 L 3/5  Left foot drop.  Ankle PF    R 5/5 L 5/5   Gait: Left foot drop.  Using a cane today.   Tone: Normal bulk and tone.   Joints: No swelling.     Labs Reviewed:  MICHELET virus antibody positive with an index of 3.03 from June 1, 2018    Imaging:   MRI brain w/wo contrast from 7/7/17  1.  Numerous foci of increased T2 and FLAIR signal in the white matter consistent with history of MS.  Extent of disease is similar to prior exam.  No evidence for active plaque formation.  2.  No acute stroke or evidence for intracranial hemorrhage.  3.  No mass or abnormal enhancement within the brain.     MRI C-Spine w/wo contrast from 11/10/17  1.  Multifocal abnormal intramedullary T2 signal intensity in the cervical spinal cord likely representing  "chronic demyelinating plaques of multiple sclerosis. When compared with the previous MRI dated 1/20/2015, there has been no significant interval   change.  2.  There is no evidence of active enhancing lesion.  3.  Low-lying cerebellar tonsils.  4.  Minimal degenerative disease.  5.  There has been no significant interval change.    MRI T-Spine w/wo contrast from 1/20/15  1.  Subtle cord signal abnormalities best seen on the T2 fat suppressed sagittal sequence again quite suspicious for demyelinating disease of multiple sclerosis. No \"active\" enhancing lesions.  2.  Minimal degenerative disc disease at T7-T8 without neural compromise.    Assessment/Plan:  Multiple sclerosis  Mr. Arnold is a 48-year-old male with a past medical history of vitamin B12 deficiency, hypertension, and vitamin D deficiency, who was diagnosed with multiple sclerosis in 2002 after developing tingling in his bilateral hands and feet.  He was previously treated with Tecfidera, but due to worsening of disease activity he switched to Tysabri in 2016 despite MICHELET virus antibody positivity.  His index was most recently checked on June 1, 2018 and was was found to be high at 3.03.  Today we had a lengthy discussion regarding PML risk with such a high MICHELET virus antibody index. I encouraged him to switch to Ocrevus which has a lower risk of PML overall, although there has been one treatment naive PML case and several Tysabri crossover cases of PML. We discussed potential side effects of Ocrevus including infusion reactions, which we will attempt to mitigate with pre-infusion steroids and benadryl, increased risk of infection including Progressive Multifocal Leukoencephalopathy, a deadly brain infection caused by reactivation of the MICHELET Virus. We also discussed that breast cancer was observed at a higher rate in clinical trials, and although the actual risk of breast cancer due to Ocrevus is not known, post-marketing data suggests it is similar to the " base rate in the general population when compared to CDC statistics. Prior to starting treatment with Ocrevus, we must check for hepatitis, latent TB, and immunity to the varicella virus. While on treatment we will monitor immunoglobulin levels.     Plan:  1. Check Labs: CBC, CMP, Immunoglobulins, CD19 counts and lymphocyte subset, vzv titer, quantiferon gold TB test. Hepatitis panel. These should precede ocrevus.   2. Repeat MRI imaging of the brain, c-spine, and t-spine w/wo contrast prior to switching to Ocrevus.        Greater than 50% of this 40 minute face to face follow up encounter was devoted to disease state counseling on Multiple Sclerosis and coordination of care. Additional time was spent on MRI review, review of lab studies, and extensive counseling regarding his disease modifying treatment, risks involved, and alternative treatment options (see above assessment and plan for further details of discussion). This note was completed using dictation software resulting in occasional speech recognition errors.        Lakshmi Tee D.O., M.P.H  MS specialist.   Board Certified Neurologist.  Neurology Clerkship Director, [facility].    Neurology,  [facility].   Tel: [phone]  Fax: [phone]

## 2019-05-14 LAB
HAV IGM SERPL QL IA: NEGATIVE
HBV CORE IGM SER QL: NEGATIVE
HBV SURFACE AG SER QL: NEGATIVE
HCV AB SER QL: NEGATIVE
VZV IGG SER IA-ACNC: 3.32

## 2019-05-15 LAB
CD19 CELLS NFR BLD: 19 %
CD20 CELLS NFR BLD: 19 %
GAMMA INTERFERON BACKGROUND BLD IA-ACNC: 0.02 IU/ML
IGA SERPL-MCNC: 124 MG/DL (ref 68–408)
IGG SERPL-MCNC: 710 MG/DL (ref 768–1632)
IGM SERPL-MCNC: 67 MG/DL (ref 35–263)
M TB IFN-G BLD-IMP: NEGATIVE
M TB IFN-G CD4+ BCKGRND COR BLD-ACNC: 0.05 IU/ML
MITOGEN IGNF BCKGRD COR BLD-ACNC: >10 IU/ML
PATH INTERP SPEC-IMP: NORMAL
QFT TB2 - NIL TBQ2: 0.03 IU/ML

## 2019-05-15 NOTE — ASSESSMENT & PLAN NOTE
Mr. Arnold is a 48-year-old male with a past medical history of vitamin B12 deficiency, hypertension, and vitamin D deficiency, who was diagnosed with multiple sclerosis in 2002 after developing tingling in his bilateral hands and feet.  He was previously treated with Tecfidera, but due to worsening of disease activity he switched to Tysabri in 2016 despite MICHELET virus antibody positivity.  His index was most recently checked on June 1, 2018 and was was found to be high at 3.03.  Today we had a lengthy discussion regarding PML risk with such a high MICHELET virus antibody index. I encouraged him to switch to Ocrevus which has a lower risk of PML overall, although there has been one treatment naive PML case and several Tysabri crossover cases of PML. We discussed potential side effects of Ocrevus including infusion reactions, which we will attempt to mitigate with pre-infusion steroids and benadryl, increased risk of infection including Progressive Multifocal Leukoencephalopathy, a deadly brain infection caused by reactivation of the MICHELET Virus. We also discussed that breast cancer was observed at a higher rate in clinical trials, and although the actual risk of breast cancer due to Ocrevus is not known, post-marketing data suggests it is similar to the base rate in the general population when compared to CDC statistics. Prior to starting treatment with Ocrevus, we must check for hepatitis, latent TB, and immunity to the varicella virus. While on treatment we will monitor immunoglobulin levels.     Plan:  1. Check Labs: CBC, CMP, Immunoglobulins, CD19 counts and lymphocyte subset, vzv titer, quantiferon gold TB test. Hepatitis panel. These should precede ocrevus.   2. Repeat MRI imaging of the brain, c-spine, and t-spine w/wo contrast prior to switching to Ocrevus.

## 2019-05-23 ENCOUNTER — TELEPHONE (OUTPATIENT)
Dept: NEUROLOGY | Facility: MEDICAL CENTER | Age: 49
End: 2019-05-23

## 2019-05-23 NOTE — TELEPHONE ENCOUNTER
[facility] called reqeusting clarification on Ocrevus infusion Rx for refills.    Called [phone] spoke with Lis who transferred me to the pharmacist Jacky and let him know after day 1 and day 15 refills should be infuse 600 mg IV every 6 months, agreed.

## 2019-05-31 ENCOUNTER — TELEPHONE (OUTPATIENT)
Dept: NEUROLOGY | Facility: MEDICAL CENTER | Age: 49
End: 2019-05-31

## 2019-05-31 ENCOUNTER — OUTPATIENT INFUSION SERVICES (OUTPATIENT)
Dept: ONCOLOGY | Facility: MEDICAL CENTER | Age: 49
End: 2019-05-31
Attending: PSYCHIATRY & NEUROLOGY
Payer: MEDICARE

## 2019-05-31 VITALS
RESPIRATION RATE: 18 BRPM | DIASTOLIC BLOOD PRESSURE: 84 MMHG | OXYGEN SATURATION: 98 % | WEIGHT: 176.37 LBS | HEIGHT: 72 IN | BODY MASS INDEX: 23.89 KG/M2 | HEART RATE: 79 BPM | SYSTOLIC BLOOD PRESSURE: 119 MMHG | TEMPERATURE: 97.1 F

## 2019-05-31 PROCEDURE — 96413 CHEMO IV INFUSION 1 HR: CPT

## 2019-05-31 PROCEDURE — 96415 CHEMO IV INFUSION ADDL HR: CPT

## 2019-05-31 PROCEDURE — 700111 HCHG RX REV CODE 636 W/ 250 OVERRIDE (IP): Performed by: PSYCHIATRY & NEUROLOGY

## 2019-05-31 PROCEDURE — 96375 TX/PRO/DX INJ NEW DRUG ADDON: CPT

## 2019-05-31 PROCEDURE — 700105 HCHG RX REV CODE 258: Performed by: PSYCHIATRY & NEUROLOGY

## 2019-05-31 PROCEDURE — 306780 HCHG STAT FOR TRANSFUSION PER CASE

## 2019-05-31 RX ORDER — METHYLPREDNISOLONE SODIUM SUCCINATE 125 MG/2ML
125 INJECTION, POWDER, LYOPHILIZED, FOR SOLUTION INTRAMUSCULAR; INTRAVENOUS ONCE
Status: COMPLETED | OUTPATIENT
Start: 2019-05-31 | End: 2019-05-31

## 2019-05-31 RX ORDER — GLUCOSAMINE HCL 500 MG
TABLET ORAL
COMMUNITY

## 2019-05-31 RX ADMIN — DIPHENHYDRAMINE HYDROCHLORIDE 50 MG: 50 INJECTION INTRAMUSCULAR; INTRAVENOUS at 10:30

## 2019-05-31 RX ADMIN — METHYLPREDNISOLONE SODIUM SUCCINATE 125 MG: 125 INJECTION, POWDER, FOR SOLUTION INTRAMUSCULAR; INTRAVENOUS at 10:20

## 2019-05-31 RX ADMIN — OCRELIZUMAB 300 MG: 300 INJECTION INTRAVENOUS at 11:15

## 2019-05-31 NOTE — PROGRESS NOTES
Mr Parsons arrives for Ocrevus first dose. Ross is in good spirits, no complaints.  PIV started with good blood return. Premedications and Ocrevus infused as ordered. Ocrelizumab rate increased slowly, 30 cc every 30 min to a max rate of 120 cc due to scratchy throat. Ross tolerated all treatment well, observed for 1 hr post infusion.  No signs or symptoms of adverse reaction observed or expressed.  PIV removed, catheter intact, pressure dressing applied to site. Ross DC'd home in good condition and in NAD, returns in 15 days. Appointment confirmed.

## 2019-05-31 NOTE — TELEPHONE ENCOUNTER
[employer] called clarifying why pt needs assistance. Called back [phone] let them know pt is insured but cannot afford medication,  Called left message to pt that [facility] has been trying to get a hold of him, to answer if they call him. Or call me with questions

## 2019-06-11 ENCOUNTER — HOSPITAL ENCOUNTER (OUTPATIENT)
Dept: RADIOLOGY | Facility: MEDICAL CENTER | Age: 49
End: 2019-06-11
Attending: PSYCHIATRY & NEUROLOGY
Payer: MEDICARE

## 2019-06-11 DIAGNOSIS — G35 MULTIPLE SCLEROSIS (HCC): ICD-10-CM

## 2019-06-11 PROCEDURE — 700117 HCHG RX CONTRAST REV CODE 255: Performed by: PSYCHIATRY & NEUROLOGY

## 2019-06-11 PROCEDURE — 70553 MRI BRAIN STEM W/O & W/DYE: CPT

## 2019-06-11 PROCEDURE — 72157 MRI CHEST SPINE W/O & W/DYE: CPT

## 2019-06-11 PROCEDURE — A9585 GADOBUTROL INJECTION: HCPCS | Performed by: PSYCHIATRY & NEUROLOGY

## 2019-06-11 PROCEDURE — 72156 MRI NECK SPINE W/O & W/DYE: CPT

## 2019-06-11 RX ORDER — GADOBUTROL 604.72 MG/ML
7.5 INJECTION INTRAVENOUS ONCE
Status: COMPLETED | OUTPATIENT
Start: 2019-06-11 | End: 2019-06-11

## 2019-06-11 RX ADMIN — GADOBUTROL 7.5 ML: 604.72 INJECTION INTRAVENOUS at 16:50

## 2019-06-12 RX ORDER — GABAPENTIN 300 MG/1
CAPSULE ORAL
Qty: 90 CAP | Refills: 4 | Status: SHIPPED | OUTPATIENT
Start: 2019-06-12 | End: 2021-02-26

## 2019-06-12 NOTE — TELEPHONE ENCOUNTER
Was the patient seen in the last year in this department? Yes    Does patient have an active prescription for medications requested? Yes    Received Request Via: Pharmacy     Rx going to mail pharmacy

## 2019-06-14 ENCOUNTER — OUTPATIENT INFUSION SERVICES (OUTPATIENT)
Dept: ONCOLOGY | Facility: MEDICAL CENTER | Age: 49
End: 2019-06-14
Attending: PSYCHIATRY & NEUROLOGY
Payer: MEDICARE

## 2019-06-14 VITALS
RESPIRATION RATE: 18 BRPM | DIASTOLIC BLOOD PRESSURE: 79 MMHG | WEIGHT: 180.56 LBS | SYSTOLIC BLOOD PRESSURE: 118 MMHG | HEIGHT: 72 IN | OXYGEN SATURATION: 91 % | HEART RATE: 80 BPM | TEMPERATURE: 98.1 F | BODY MASS INDEX: 24.46 KG/M2

## 2019-06-14 PROCEDURE — 96375 TX/PRO/DX INJ NEW DRUG ADDON: CPT

## 2019-06-14 PROCEDURE — 700105 HCHG RX REV CODE 258

## 2019-06-14 PROCEDURE — 96413 CHEMO IV INFUSION 1 HR: CPT

## 2019-06-14 PROCEDURE — 700111 HCHG RX REV CODE 636 W/ 250 OVERRIDE (IP)

## 2019-06-14 PROCEDURE — 700105 HCHG RX REV CODE 258: Performed by: PSYCHIATRY & NEUROLOGY

## 2019-06-14 PROCEDURE — 700111 HCHG RX REV CODE 636 W/ 250 OVERRIDE (IP): Performed by: PSYCHIATRY & NEUROLOGY

## 2019-06-14 PROCEDURE — 96415 CHEMO IV INFUSION ADDL HR: CPT

## 2019-06-14 PROCEDURE — 306780 HCHG STAT FOR TRANSFUSION PER CASE

## 2019-06-14 RX ORDER — METHYLPREDNISOLONE SODIUM SUCCINATE 125 MG/2ML
125 INJECTION, POWDER, LYOPHILIZED, FOR SOLUTION INTRAMUSCULAR; INTRAVENOUS ONCE
Status: COMPLETED | OUTPATIENT
Start: 2019-06-14 | End: 2019-06-14

## 2019-06-14 RX ADMIN — METHYLPREDNISOLONE SODIUM SUCCINATE 125 MG: 125 INJECTION, POWDER, FOR SOLUTION INTRAMUSCULAR; INTRAVENOUS at 10:15

## 2019-06-14 RX ADMIN — SODIUM CHLORIDE 300 MG: 9 INJECTION, SOLUTION INTRAVENOUS at 10:45

## 2019-06-14 RX ADMIN — DIPHENHYDRAMINE HYDROCHLORIDE 50 MG: 50 INJECTION INTRAMUSCULAR; INTRAVENOUS at 10:23

## 2019-06-14 NOTE — PROGRESS NOTES
Appointment date: 6/14/2019   Patient assistance medication: Ocrevus  Patient assistant medication received and in bin: yes  Patient to be charged: no

## 2019-06-14 NOTE — PROGRESS NOTES
Pti s here for his scheduled Ocrevus. Premedicated as ordered. Ocrevus titrated up per protocol to the max rate of 180ml/h without an incident followed by one hour observation. Discharged home to self care. Next appointment scheduled.

## 2019-07-25 ENCOUNTER — OFFICE VISIT (OUTPATIENT)
Dept: NEUROLOGY | Facility: MEDICAL CENTER | Age: 49
End: 2019-07-25
Payer: MEDICARE

## 2019-07-25 VITALS
HEART RATE: 101 BPM | BODY MASS INDEX: 23.23 KG/M2 | OXYGEN SATURATION: 93 % | WEIGHT: 171.5 LBS | HEIGHT: 72 IN | DIASTOLIC BLOOD PRESSURE: 78 MMHG | SYSTOLIC BLOOD PRESSURE: 122 MMHG | TEMPERATURE: 97.2 F

## 2019-07-25 DIAGNOSIS — G35 MULTIPLE SCLEROSIS (HCC): ICD-10-CM

## 2019-07-25 PROCEDURE — 99214 OFFICE O/P EST MOD 30 MIN: CPT | Performed by: PSYCHIATRY & NEUROLOGY

## 2019-07-25 NOTE — ASSESSMENT & PLAN NOTE
Mr. Ross Lindsay is a 47 yo M with a past medical history of vitamin B12 deficiency, hypertension, and vitamin D deficiency, diagnosed with relapsing remitting multiple sclerosis in 2002 after developing tingling in his bilateral hands and feet.  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  Overall he is tolerating the medication well other than throat closure sensation with the first infusion.  Today we reviewed his MRI of the brain from June 11, 2019 which appeared overall stable.  We also discussed with to expect with Ocrevus treatment.    Plan:  1.  Continue Ocrevus infusions every 6 months.  2.  Lab studies to include immunoglobulins and CD19 counts to be performed in December 2019 to his next infusion.  3.  Next MRI of the brain should also be done in December 2019 for new baseline.

## 2019-07-25 NOTE — PROGRESS NOTES
CC: Multiple sclerosis.    HPI:     Mr. Ross Lindsay is a 48-year-old male with a history of hypertension, and vitamin B12 deficiency who returns for outpatient neurologic follow-up for multiple sclerosis.  He was last seen on May 13, 2019, at which time it was recommended he switch disease modifying treatments as he was on Tysabri with MICHELET virus antibody positive with a relatively high index of 3.03.     He had new MRI imaging on June 11, 2019, which did not show any disease activity or areas concerning for PML.  He started Ocrevus infusions on June 14, 2019, and reported that after his first infusion, his throat felt like it was closing up a bit.  He tolerated the second infusion well.  He denies any new symptoms related to his MS.       MS History:  Diagnosed: 2002  Lumbar puncture:  Yes OCB positive   First presentation: Tingling in bilateral hands and feet.   Disease modifying treatment:               Current:  Ocrevus-initial infusion on June 14, 2019.              Previous: Tecfidera, Tysabri from May 2016 until May 2019.  Former or other neurologist: Dr. Melissa Bloch  Last attack: March 2015, Headache, Diplopia.    Last MRI: July 2017  MICHELET virus antibody: Positive, index of 3.03 from June 1, 2018.    Review of Systems   Constitutional: Positive for malaise/fatigue.   HENT: Negative for hearing loss.    Eyes: Positive for blurred vision and double vision. Negative for pain.   Cardiovascular: Negative for chest pain.   Gastrointestinal: Negative for diarrhea, nausea and vomiting.   Neurological: Positive for focal weakness.   All other review of systems are negative.        Current Outpatient Medications:   •  gabapentin (NEURONTIN) 300 MG Cap, Take 1 capsule by mouth 3 times daily., Disp: 90 Cap, Rfl: 4  •  Cholecalciferol (VITAMIN D3) 3000 units Tab, Take  by mouth., Disp: , Rfl:   •  tamsulosin (FLOMAX) 0.4 MG capsule, Take 0.4 mg by mouth every day., Disp: , Rfl:   •  Cyanocobalamin (VITAMIN  "B-12) 1000 MCG Tab, TAKE ONE TABLET BY MOUTH DAILY, Disp: 100 Tab, Rfl: 6  •  Dalfampridine (AMPYRA) 10 MG TABLET SR 12 HR, Take 10 mg by mouth 2 Times a Day., Disp: 60 Tab, Rfl: 11  •  polyethylene glycol/lytes (MIRALAX) Pack, Take 1 Packet by mouth as needed. Indications: Constipation, Disp: 90 Each, Rfl: 3  •  lisinopril (PRINIVIL) 10 MG Tab, Take 1 tablet by mouth every day, Disp: 90 Tab, Rfl: 3  •  LORazepam (ATIVAN) 0.5 MG Tab, Take 1 tablet up to twice daily, if needed, for anxiety or panic, Disp: 60 Tab, Rfl: 2    Physical Examination:  Ambulatory Vitals  Encounter Vitals  Height: 183.5 cm (6' 0.24\")  Constitutional: Well-developed, well-nourished, good hygiene. Appears stated age.  Cardiovascular: RRR, with no murmurs, rubs or gallops. No carotid bruits.   Respiratory: Lungs CTA B/L, no W/R/R.   Abdomen: Soft Non-tender to Palpation. Non-distended.  Extremities: No peripheral edema, pedal pulses intact.   Skin: Warm, dry, intact. No rashes observed.  Eyes: Sclera anicteric. No nystagmus observed.   Neurologic:   Mental Status: Awake and alert.    Speech: Speech is fluent with normal prosody.   .           Fund of Knowledge: Appropriate   Cranial Nerves:    CN II: Pupils are equal and react appropriately. No APD noted.    CN III, IV, VI: Good eye closure.  Right ANA.    CN V: Facial sensation is intact and symmetric.     CN VII: No evidence of facial droop.     CN VIII: Hearing is grossly intact.    CN IX and X: Palate elevates symmetrically.    CN XI: Shoulder shrug is symmetric.     CN XII: Tongue is midline.   Sensory: Decreased temperature sensation to the level of the upper thighs bilaterally.   Coordination: There is no discoordination detected with finger tapping or finger to nose testing.        DTR's: Reflexes are 3+ in his bilateral arms and knees, 4+ in the bilateral ankles with nonsustained clonus.   Babinski: Toes are mute bilaterally.    Romberg: Deferred.   Movements: No tremors " "noted.  Musculoskeletal:    Strength:   Deltoids      R 5/5 L 5/5  Biceps        R 5/5 L 5/5  Triceps       R 5/5 L 5/5  Wrist Ext    R 5/5 L 5/5  Finger Flex R 5/5 L 5/5  Finger Ext   R 5/5 L 5/5  Hip Flex      R 5/5 L 5/5  Knee Flex   R 5/5 L 5/5  Knee Ext    R 5/5 L 5/5  Ankle DF    R 5/5 L 3/5 left foot drop.  Ankle PF    R 5/5 L 5/5   Gait: Steppage gait.  Uses a cane.   Tone: Increased tone in the left lower extremity.   Joints: No joint swelling.   Psychiatric: Mood and affect are appropriate.     Imaging reviewed:  Today I reviewed the patient's most recent MRI images with him in the examination room. I explained basic terminology of MRI's, verbalized my assessment, and answered his questions.     MRI brain with and without contrast from June 11, 2019.  1.  Stable appearing moderate supratentorial white matter disease with lesion morphology consistent with multiple sclerosis. No \"active\" enhancing lesions, no definite new lesions, and no overall progression of lesion burden is evident on today's exam.  2.  Demyelinating lesions again noted in the brainstem and right middle cerebellar peduncle. No significant change.  3.  Demyelinating lesion in the upper cervical spinal cord at the C2 level. This was seen on MRI cervical spine 11/10/2017.          Assessment and Plan:     Multiple sclerosis  Mr. Ross Lindsay is a 49 yo M with a past medical history of vitamin B12 deficiency, hypertension, and vitamin D deficiency, diagnosed with relapsing remitting multiple sclerosis in 2002 after developing tingling in his bilateral hands and feet.  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  Overall he is tolerating the medication well other than throat closure sensation with the first infusion.  Today we reviewed his " MRI of the brain from June 11, 2019 which appeared overall stable.  We also discussed with to expect with Ocrevus treatment.    Plan:  1.  Continue Ocrevus infusions every 6 months.  2.  Lab studies to include immunoglobulins and CD19 counts to be performed in December 2019 to his next infusion.  3.  Next MRI of the brain should also be done in December 2019 for new baseline.        Lakshmi Tee D.O., M.P.H  MS specialist.   Board Certified Neurologist.  Neurology Clerkship Director, [facility].    Neurology,  [facility].   Tel: [phone]  Fax: [phone]

## 2019-08-04 ASSESSMENT — ENCOUNTER SYMPTOMS
FOCAL WEAKNESS: 1
DOUBLE VISION: 1
NAUSEA: 0
EYE PAIN: 0
BLURRED VISION: 1
DIARRHEA: 0
VOMITING: 0

## 2019-12-02 ENCOUNTER — OUTPATIENT INFUSION SERVICES (OUTPATIENT)
Dept: ONCOLOGY | Facility: MEDICAL CENTER | Age: 49
End: 2019-12-02
Attending: PSYCHIATRY & NEUROLOGY
Payer: MEDICARE

## 2019-12-02 VITALS
TEMPERATURE: 97 F | OXYGEN SATURATION: 97 % | BODY MASS INDEX: 24.22 KG/M2 | HEIGHT: 72 IN | WEIGHT: 178.79 LBS | HEART RATE: 78 BPM | SYSTOLIC BLOOD PRESSURE: 110 MMHG | DIASTOLIC BLOOD PRESSURE: 75 MMHG | RESPIRATION RATE: 18 BRPM

## 2019-12-02 PROCEDURE — 96366 THER/PROPH/DIAG IV INF ADDON: CPT

## 2019-12-02 PROCEDURE — 96413 CHEMO IV INFUSION 1 HR: CPT

## 2019-12-02 PROCEDURE — 96415 CHEMO IV INFUSION ADDL HR: CPT

## 2019-12-02 PROCEDURE — 96375 TX/PRO/DX INJ NEW DRUG ADDON: CPT

## 2019-12-02 PROCEDURE — 96365 THER/PROPH/DIAG IV INF INIT: CPT

## 2019-12-02 PROCEDURE — 306780 HCHG STAT FOR TRANSFUSION PER CASE

## 2019-12-02 PROCEDURE — 700111 HCHG RX REV CODE 636 W/ 250 OVERRIDE (IP): Performed by: PSYCHIATRY & NEUROLOGY

## 2019-12-02 PROCEDURE — 700105 HCHG RX REV CODE 258: Performed by: PSYCHIATRY & NEUROLOGY

## 2019-12-02 RX ORDER — METHYLPREDNISOLONE SODIUM SUCCINATE 125 MG/2ML
125 INJECTION, POWDER, LYOPHILIZED, FOR SOLUTION INTRAMUSCULAR; INTRAVENOUS ONCE
Status: COMPLETED | OUTPATIENT
Start: 2019-12-02 | End: 2019-12-02

## 2019-12-02 RX ADMIN — OCRELIZUMAB 600 MG: 300 INJECTION INTRAVENOUS at 12:05

## 2019-12-02 RX ADMIN — DIPHENHYDRAMINE HYDROCHLORIDE 50 MG: 50 INJECTION INTRAMUSCULAR; INTRAVENOUS at 11:45

## 2019-12-02 RX ADMIN — METHYLPREDNISOLONE SODIUM SUCCINATE 125 MG: 125 INJECTION, POWDER, FOR SOLUTION INTRAMUSCULAR; INTRAVENOUS at 11:40

## 2019-12-02 NOTE — PROGRESS NOTES
Patient presents to clinic for Ocrevus. Pt denies any recent infections, fevers, or chills. PIV established, brisk blood return noted. Premeds administered as ordered. Ocrevus infused as ordered, starting at an initial rate of 40 ml/hr and titrated every 30 minutes to a max rate of 200 ml/hr. Pt tolerated infusion well with no s/s of adverse reaction. PIV flushed, catheter removed intact. Pt discharged in stable, ambulatory condition.

## 2019-12-03 NOTE — PROGRESS NOTES
Appointment date: 12/2/2019   Patient assistance medication: Ocrevus  Patient assistant medication received and in bin: yes  Patient to be charged: no

## 2020-01-13 ENCOUNTER — OFFICE VISIT (OUTPATIENT)
Dept: NEUROLOGY | Facility: MEDICAL CENTER | Age: 50
End: 2020-01-13
Payer: MEDICARE

## 2020-01-13 VITALS
SYSTOLIC BLOOD PRESSURE: 112 MMHG | BODY MASS INDEX: 24.24 KG/M2 | TEMPERATURE: 97.8 F | HEART RATE: 66 BPM | WEIGHT: 179 LBS | OXYGEN SATURATION: 98 % | DIASTOLIC BLOOD PRESSURE: 72 MMHG | HEIGHT: 72 IN

## 2020-01-13 DIAGNOSIS — G35 MULTIPLE SCLEROSIS (HCC): ICD-10-CM

## 2020-01-13 PROCEDURE — 99214 OFFICE O/P EST MOD 30 MIN: CPT | Performed by: PSYCHIATRY & NEUROLOGY

## 2020-01-15 ASSESSMENT — ENCOUNTER SYMPTOMS
DIARRHEA: 0
NAUSEA: 0
BLURRED VISION: 1
EYE PAIN: 0
FOCAL WEAKNESS: 1
VOMITING: 0
DOUBLE VISION: 1

## 2020-01-16 NOTE — ASSESSMENT & PLAN NOTE
Patient comes in with history relapsing multiple sclerosis.  He was transferred from [facility] to [facility] due to MICHELET virus positive antibodies.  Patient has done well with this transition and is trying to improve his strength with walking and is looking at skiing with assistance.  Patient is due for some laboratory testing and MRIs prior to his next [facility] infusion.

## 2020-01-16 NOTE — PROGRESS NOTES
CC: Multiple sclerosis.    HPI:     Mr. Ross Lindsay is a 48-year-old male with a history of hypertension, and vitamin B12 deficiency who returns for outpatient neurologic follow-up for multiple sclerosis.  He was last seen on May 13, 2019, at which time it was recommended he switch disease modifying treatments as he was on Tysabri with MICHELET virus antibody positive with a relatively high index of 3.03.     He had new MRI imaging on June 11, 2019, which did not show any disease activity or areas concerning for PML.  He started Ocrevus infusions on June 14, 2019, and reported that after his first infusion, his throat felt like it was closing up a bit.  He tolerated the second and third infusions well.  He denies any new symptoms related to his MS. patient feels like he has been doing better on the Ocrevus.       MS History:  Diagnosed: 2002  Lumbar puncture:  Yes OCB positive   First presentation: Tingling in bilateral hands and feet.   Disease modifying treatment:               Current:  Ocrevus-initial infusion on June 14, 2019.              Previous: Tecfidera, Tysabri from May 2016 until May 2019.  Former or other neurologist: Dr. Melissa Bloch  Last attack: March 2015, Headache, Diplopia.    Last MRI: July 2017  MICHELET virus antibody: Positive, index of 3.03 from June 1, 2018.    Review of Systems   Constitutional: Positive for malaise/fatigue.   HENT: Negative for hearing loss.    Eyes: Positive for blurred vision and double vision. Negative for pain.   Cardiovascular: Negative for chest pain.   Gastrointestinal: Negative for diarrhea, nausea and vomiting.   Neurological: Positive for focal weakness.   All other review of systems are negative.        Current Outpatient Medications:   •  LORazepam (ATIVAN) 0.5 MG Tab, Take 1 tablet up to twice daily, if needed, for anxiety or panic, Disp: 60 Tab, Rfl: 2  •  polyethylene glycol/lytes (MIRALAX) Pack, Take 1 Packet by mouth as needed. Indications:  "Constipation, Disp: 90 Each, Rfl: 3  •  lisinopril (PRINIVIL) 10 MG Tab, Take 1 tablet by mouth every day, Disp: 90 Tab, Rfl: 3  •  gabapentin (NEURONTIN) 300 MG Cap, Take 1 capsule by mouth 3 times daily., Disp: 90 Cap, Rfl: 4  •  Cholecalciferol (VITAMIN D3) 3000 units Tab, Take  by mouth., Disp: , Rfl:   •  tamsulosin (FLOMAX) 0.4 MG capsule, Take 0.4 mg by mouth every day., Disp: , Rfl:   •  Cyanocobalamin (VITAMIN B-12) 1000 MCG Tab, TAKE ONE TABLET BY MOUTH DAILY, Disp: 100 Tab, Rfl: 6  •  Dalfampridine (AMPYRA) 10 MG TABLET SR 12 HR, Take 10 mg by mouth 2 Times a Day., Disp: 60 Tab, Rfl: 11  •  promethazine (PHENERGAN) 25 MG Tab, Take 1 Tab by mouth every 6 hours as needed for Nausea/Vomiting. (Patient not taking: Reported on 1/13/2020), Disp: 12 Tab, Rfl: 0    Physical Examination:  Ambulatory Vitals  Encounter Vitals  Height: 183.5 cm (6' 0.24\")  Constitutional: Well-developed, well-nourished, good hygiene. Appears stated age.  Cardiovascular: RRR, with no murmurs, rubs or gallops. No carotid bruits.   Respiratory: Lungs CTA B/L, no W/R/R.   Abdomen: Soft Non-tender to Palpation. Non-distended.  Extremities: No peripheral edema, pedal pulses intact.   Skin: Warm, dry, intact. No rashes observed.  Eyes: Sclera anicteric. No nystagmus observed.   Neurologic:   Mental Status: Awake and alert.    Speech: Speech is fluent with normal prosody.   .           Fund of Knowledge: Appropriate   Cranial Nerves:    CN II: Pupils are equal and react appropriately. No APD noted.    CN III, IV, VI: Good eye closure.  Right ANA.    CN V: Facial sensation is intact and symmetric.     CN VII: No evidence of facial droop.     CN VIII: Hearing is grossly intact.    CN IX and X: Palate elevates symmetrically.    CN XI: Shoulder shrug is symmetric.     CN XII: Tongue is midline.   Sensory: Decreased temperature sensation to the level of the upper thighs bilaterally.   Coordination: There is no discoordination detected with finger " "tapping or finger to nose testing.        DTR's: Reflexes are 3+ in his bilateral arms and knees, 4+ in the bilateral ankles with nonsustained clonus.   Babinski: Toes are mute bilaterally.    Romberg: Deferred.   Movements: No tremors noted.  Musculoskeletal:    Strength:   Deltoids      R 5/5 L 5/5  Biceps        R 5/5 L 5/5  Triceps       R 5/5 L 5/5  Wrist Ext    R 5/5 L 5/5  Finger Flex R 5/5 L 5/5  Finger Ext   R 5/5 L 5/5  Hip Flex      R 5/5 L 5/5  Knee Flex   R 5/5 L 5/5  Knee Ext    R 5/5 L 5/5  Ankle DF    R 5/5 L 3/5 left foot drop.  Ankle PF    R 5/5 L 5/5   Gait: Steppage gait.  Uses a cane.   Tone: Increased tone in the left lower extremity.   Joints: No joint swelling.   Psychiatric: Mood and affect are appropriate.     Imaging reviewed:  Today I reviewed the patient's most recent MRI images with him in the examination room. I explained basic terminology of MRI's, verbalized my assessment, and answered his questions.     MRI brain with and without contrast from June 11, 2019.  1.  Stable appearing moderate supratentorial white matter disease with lesion morphology consistent with multiple sclerosis. No \"active\" enhancing lesions, no definite new lesions, and no overall progression of lesion burden is evident on today's exam.  2.  Demyelinating lesions again noted in the brainstem and right middle cerebellar peduncle. No significant change.  3.  Demyelinating lesion in the upper cervical spinal cord at the C2 level. This was seen on MRI cervical spine 11/10/2017.          Assessment and Plan:     Multiple sclerosis  Patient comes in with history relapsing multiple sclerosis.  He was transferred from [facility] to [facility] due to MICHELET virus positive antibodies.  Patient has done well with this transition and is trying to improve his strength with walking and is looking at skiing with assistance.  Patient is due for some laboratory testing and MRIs prior to his next [facility] infusion.    45 minutes spent with " this patient of which greater than 50% was involved in education and counseling on multiple sclerosis.  Patient was encouraged to increase his physical activity and we discussed the importance of healthy lifestyle with diet and no smoking.    Melissa P. Bloch  MS specialist.   Board Certified Neurologist.    Neurology,  [facility].   Tel: [phone]  Fax: [phone]

## 2020-06-01 ENCOUNTER — TELEPHONE (OUTPATIENT)
Dept: ONCOLOGY | Facility: MEDICAL CENTER | Age: 50
End: 2020-06-01

## 2020-06-02 ENCOUNTER — OUTPATIENT INFUSION SERVICES (OUTPATIENT)
Dept: ONCOLOGY | Facility: MEDICAL CENTER | Age: 50
End: 2020-06-02
Attending: PSYCHIATRY & NEUROLOGY
Payer: MEDICARE

## 2020-06-02 ENCOUNTER — OFFICE VISIT (OUTPATIENT)
Dept: NEUROLOGY | Facility: MEDICAL CENTER | Age: 50
End: 2020-06-02
Payer: MEDICARE

## 2020-06-02 VITALS
SYSTOLIC BLOOD PRESSURE: 120 MMHG | WEIGHT: 169.75 LBS | HEART RATE: 79 BPM | HEIGHT: 72 IN | OXYGEN SATURATION: 99 % | BODY MASS INDEX: 22.99 KG/M2 | TEMPERATURE: 97.6 F | DIASTOLIC BLOOD PRESSURE: 76 MMHG

## 2020-06-02 VITALS
OXYGEN SATURATION: 98 % | SYSTOLIC BLOOD PRESSURE: 117 MMHG | HEART RATE: 69 BPM | DIASTOLIC BLOOD PRESSURE: 80 MMHG | HEIGHT: 72 IN | RESPIRATION RATE: 18 BRPM | TEMPERATURE: 98 F | WEIGHT: 171.96 LBS | BODY MASS INDEX: 23.29 KG/M2

## 2020-06-02 DIAGNOSIS — G35 MULTIPLE SCLEROSIS (HCC): ICD-10-CM

## 2020-06-02 DIAGNOSIS — E53.8 VITAMIN B 12 DEFICIENCY: ICD-10-CM

## 2020-06-02 DIAGNOSIS — Z51.81 THERAPEUTIC DRUG MONITORING: ICD-10-CM

## 2020-06-02 PROCEDURE — 86360 T CELL ABSOLUTE COUNT/RATIO: CPT

## 2020-06-02 PROCEDURE — 82784 ASSAY IGA/IGD/IGG/IGM EACH: CPT

## 2020-06-02 PROCEDURE — 99214 OFFICE O/P EST MOD 30 MIN: CPT | Mod: 25 | Performed by: REGISTERED NURSE

## 2020-06-02 PROCEDURE — 96375 TX/PRO/DX INJ NEW DRUG ADDON: CPT

## 2020-06-02 PROCEDURE — 86355 B CELLS TOTAL COUNT: CPT

## 2020-06-02 PROCEDURE — 700111 HCHG RX REV CODE 636 W/ 250 OVERRIDE (IP): Performed by: PSYCHIATRY & NEUROLOGY

## 2020-06-02 PROCEDURE — 96415 CHEMO IV INFUSION ADDL HR: CPT

## 2020-06-02 PROCEDURE — 86357 NK CELLS TOTAL COUNT: CPT

## 2020-06-02 PROCEDURE — 700111 HCHG RX REV CODE 636 W/ 250 OVERRIDE (IP)

## 2020-06-02 PROCEDURE — 96372 THER/PROPH/DIAG INJ SC/IM: CPT | Performed by: REGISTERED NURSE

## 2020-06-02 PROCEDURE — 700105 HCHG RX REV CODE 258

## 2020-06-02 PROCEDURE — 86359 T CELLS TOTAL COUNT: CPT

## 2020-06-02 PROCEDURE — 96413 CHEMO IV INFUSION 1 HR: CPT

## 2020-06-02 PROCEDURE — 306780 HCHG STAT FOR TRANSFUSION PER CASE

## 2020-06-02 PROCEDURE — 700105 HCHG RX REV CODE 258: Performed by: PSYCHIATRY & NEUROLOGY

## 2020-06-02 RX ORDER — BACLOFEN 10 MG/1
10 TABLET ORAL 3 TIMES DAILY
Qty: 90 TAB | Refills: 11 | Status: SHIPPED | OUTPATIENT
Start: 2020-06-02 | End: 2021-04-12 | Stop reason: SDUPTHER

## 2020-06-02 RX ORDER — CYANOCOBALAMIN 1000 UG/ML
1000 INJECTION, SOLUTION INTRAMUSCULAR; SUBCUTANEOUS ONCE
Status: COMPLETED | OUTPATIENT
Start: 2020-06-02 | End: 2020-06-02

## 2020-06-02 RX ORDER — METHYLPREDNISOLONE SODIUM SUCCINATE 125 MG/2ML
125 INJECTION, POWDER, LYOPHILIZED, FOR SOLUTION INTRAMUSCULAR; INTRAVENOUS ONCE
Status: COMPLETED | OUTPATIENT
Start: 2020-06-02 | End: 2020-06-02

## 2020-06-02 RX ADMIN — CYANOCOBALAMIN 1000 MCG: 1000 INJECTION, SOLUTION INTRAMUSCULAR; SUBCUTANEOUS at 17:21

## 2020-06-02 RX ADMIN — DIPHENHYDRAMINE HYDROCHLORIDE 50 MG: 50 INJECTION, SOLUTION INTRAMUSCULAR; INTRAVENOUS at 11:40

## 2020-06-02 RX ADMIN — METHYLPREDNISOLONE SODIUM SUCCINATE 125 MG: 125 INJECTION, POWDER, FOR SOLUTION INTRAMUSCULAR; INTRAVENOUS at 11:44

## 2020-06-02 RX ADMIN — SODIUM CHLORIDE 600 MG: 9 INJECTION, SOLUTION INTRAVENOUS at 12:14

## 2020-06-02 ASSESSMENT — PATIENT HEALTH QUESTIONNAIRE - PHQ9: CLINICAL INTERPRETATION OF PHQ2 SCORE: 0

## 2020-06-02 ASSESSMENT — FIBROSIS 4 INDEX
FIB4 SCORE: 1.45
FIB4 SCORE: 1.45

## 2020-06-02 NOTE — PROGRESS NOTES
Pt ambulatory to [facility] for i0oyilw Ocrevus infusion.  Pt w/ no s/s of infection, pt has no complaints at this time.  PIV established in RFA, brisk blood return noted, flushed per protocol.  Pt pre-meds given 30 minutes prior to initiation of infusion.  Ocrevus infused w/ filter in place per subsequent dose policy, w/ no adverse reactions.  Pt monitored for 1 hour post-infusion w/ no adverse reactions.  PIV removed, gauze and tape dressing applied.  Pt left in care of self in NAD.  Confirmed pt's next appt.

## 2020-06-03 LAB
ANNOTATION COMMENT IMP: ABNORMAL
CD19 CELLS NFR SPEC: <1 % (ref 6–23)
CD3 CELLS # BLD: 1101 CELLS/UL (ref 570–2400)
CD3 CELLS NFR SPEC: 89 % (ref 62–87)
CD3+CD4+ CELLS # BLD: 853 CELLS/UL (ref 430–1800)
CD3+CD4+ CELLS NFR BLD: 69 % (ref 32–64)
CD3+CD4+ CELLS/CD3+CD8+ CLL BLD: 3.45 RATIO (ref 0.8–3.9)
CD3+CD8+ CELLS # BLD: 244 CELLS/UL (ref 210–1200)
CD3+CD8+ CELLS NFR SPEC: 20 % (ref 15–46)
CD3-CD16+CD56+ CELLS # SPEC: 128 CELLS/UL (ref 78–470)
CD3-CD16+CD56+ CELLS NFR SPEC: 10 % (ref 4–26)
CELLS.CD3-CD19+ [#/VOLUME] IN BLOOD: <1 CELLS/UL (ref 91–610)

## 2020-06-03 NOTE — PROGRESS NOTES
Chief Complaint   Patient presents with   • Follow-Up     MS       Problem List Items Addressed This Visit     Multiple sclerosis (HCC)     Pt has worse muscle spasticity.         Relevant Orders    IMMUNOGLOBULINS A/G/M SERUM    LYMPHOCYTE SUBSET PANEL 5-TOTAL LYMPHOCYTE      Other Visit Diagnoses     Vitamin B 12 deficiency        Relevant Medications    cyanocobalamin (VITAMIN B-12) injection 1,000 mcg (Completed)    Therapeutic drug monitoring        Relevant Orders    IMMUNOGLOBULINS A/G/M SERUM    LYMPHOCYTE SUBSET PANEL 5-TOTAL LYMPHOCYTE          History of present illness:  Ross Lindsay 49 y.o. male presents today for MS follow-up and complaints of sustained clonus to BLE's after raising from chair and standing.    PREVIOUS DMA's:  Tecfidera, Tysabri      Current DMA regimen:  Ocrevus     Prior neurologists:  Brink, Bloch     Prior brain imaging: All reviewed with patient        Past medical history:   Past Medical History:   Diagnosis Date   • Acid reflux    • Depression    • MS (multiple sclerosis) (HCC)        Past surgical history:   History reviewed. No pertinent surgical history.    Family history:   Family History   Problem Relation Age of Onset   • Cancer Mother         lung   • Diabetes Mother    • Heart Disease Mother    • Hypertension Mother        Social history:  His father lives with him and they are a good support for each other.      Current medications:   Current Outpatient Medications   Medication   • baclofen (LIORESAL) 10 MG Tab   • LORazepam (ATIVAN) 0.5 MG Tab   • polyethylene glycol/lytes (MIRALAX) Pack   • lisinopril (PRINIVIL) 10 MG Tab   • gabapentin (NEURONTIN) 300 MG Cap   • Cholecalciferol (VITAMIN D3) 3000 units Tab   • tamsulosin (FLOMAX) 0.4 MG capsule   • Cyanocobalamin (VITAMIN B-12) 1000 MCG Tab   • Dalfampridine (AMPYRA) 10 MG TABLET SR 12 HR   • promethazine (PHENERGAN) 25 MG Tab     No current facility-administered medications for this visit.   "      Medication Allergy:  No Known Allergies    Review of systems:   ROS:   Constitutional: No fevers or chills.  Eyes: No blurry vision or eye pain.  ENT: No dysphagia or hearing loss.  Respiratory: No cough or shortness of breath.  Cardiovascular: No chest pain or palpitations.  GI: No nausea, vomiting, or diarrhea.  : No urinary incontinence or dysuria.  Musculoskeletal: No joint swelling or arthralgias. C/O of \"jerky movements\" to bilateral lower extremities.  Skin: No skin rashes.  Neuro: No headaches, dizziness, or tremors.  Endocrine: No heat or cold intolerance. No polydipsia or polyuria.  Psych: No depression or anxiety.  Heme/Lymph: No easy bruising or swollen lymph nodes.  All other review of systems were reviewed and were negative    Physical examination:   Vitals:    06/02/20 0946   BP: 120/76   BP Location: Left arm   Patient Position: Sitting   BP Cuff Size: Adult   Pulse: 79   Temp: 36.4 °C (97.6 °F)   TempSrc: Temporal   SpO2: 99%   Weight: 77 kg (169 lb 12.1 oz)   Height: 1.829 m (6')     General: Patient in no acute distress, pleasant and cooperative.  HEENT: Normocephalic, no signs of acute trauma.   Neck: supple, no meningeal signs or carotid bruits. There is normal range of motion. No tenderness on exam.   Chest: clear to auscultation. No cough.   CV: RRR, no murmurs.   Abdomen: soft, non distended or tender.   Skin: no signs of acute rashes or trauma.   Musculoskeletal: joints exhibit full range of motion, without any pain to palpation. There are no signs of joint or muscle swelling. There is no tenderness to deep palpation of muscles.   Psychiatric: No hallucinatory behavior. Denies symptoms of depression or suicidal ideation. Mood and affect appear normal on exam.     NEUROLOGY EXAM  Eyes: Sclera anicteric.  Neurologic:              Mental Status: Awake, alert, oriented x 3.              Speech: Fluent with normal prosody.              Memory: Able to recall recent and remote events " "accurately.               Concentration: Attentive. Able to focus on history and follow multi-step commands.              Fund of Knowledge: Appropriate.              Cranial Nerves:                          CN II: PERRL. No afferent pupillary defect.                          CN III, IV, VI: Good eye closure, EOMI.                           CN V: Facial sensation intact and symmetric.                           CN VII: No facial asymmetry.                           CN VIII: Hearing intact.                           CN IX and X: Palate elevates symmetrically. Normal gag reflex.                          CN XI: Symmetric shoulder shrug.                           CN XII: Tongue midline.               Sensory:  Equal sensation to both sides of body              Coordination: No evidence of past-pointing on finger to nose testing, no dysdiadochokinesia.               DTR's: 2+ throughout withsustained clonus to lower extremities at intervals               Romberg: positive drift              Movements: No tremors observed.   Musculoskeletal: + clonus noted to bilateral lower extremities              Strength: 5/5 in upper and 4/5 to lower extremities bilaterally.              Gait: unsteady/guarded, narrow based.               Tone: atrophy to all four extemities              Joints: No swelling.    ANCILLARY DATA REVIEWED:     Lab Data Review:  Current labs reviewed and therapeutic drug monitoring for Ocrevus initiated.    Imagin19  Thoracic spine  IMPRESSION:     1.  Extensive longitudinally oriented demyelinating lesions involving virtually every thoracic level with substantial progression of lesion burden since 2015. No \"active\" enhancing lesions are evident on today's exam.  2.  T7-T8 minimal ventral extradural defect without compromise of the thecal sac or cord.      IMPRESSION:     1.  Extensive chronic appearing demyelinating lesions at multiple levels in the cervical spinal cord as detailed above. No " "definite new lesions or \"active\" enhancing lesions on today's exam.  2.  No significant disc bulge or protrusion, central stenosis, or foraminal stenosis at any cervical level.    ASSESSMENT AND PLAN:   Ross Lindsay is a 48 year old male with longstanding history of MS, Vitamin B deficiency and + MICHELET Virus for which he was changed from Tysabri to Ocrevus.  Infusion today.  Here with complaints of sustained clonus to bilateral lower extremities.        1. Multiple sclerosis (HCC)    - IMMUNOGLOBULINS A/G/M SERUM; Future  - LYMPHOCYTE SUBSET PANEL 5-TOTAL LYMPHOCYTE; Future    2. Vitamin B 12 deficiency    - cyanocobalamin (VITAMIN B-12) injection 1,000 mcg given    3. Therapeutic drug monitoring    - IMMUNOGLOBULINS A/G/M SERUM; Future  - LYMPHOCYTE SUBSET PANEL 5-TOTAL LYMPHOCYTE; Future        FOLLOW-UP:   F/U labs and f/u outcome of prescribed muscle relaxant.    EDUCATION AND COUNSELING:  The patient/family educated on diagnosis and prognosis. They understand MS is a chronic progressive disorder for which there is currently no cure. Despite best efforts in management, the condition is likely to progress and carries significant risk for disability including physical and cognitive. Sustained clonus pathophysiology explained to patient and he verbalizes understanding of the muscle relaxant that is ordered.    Effectiveness, indications,side effects, and safety profile of available Disease Modifying Agents (DMA’s) reviewed.     Discussed importance of regular exercise program and prevention of cardiovascular disease.    The patient will require frequent follow up including:  Laboratory monitoring -Imaging of entire neuro-axis (brain and spinal cord) with MRI’s w/wo contrast, every year and prn   Patient/family counseled on medication compliance.     The patient understands and agrees that due to the complexity of his/her diagnosis, results of any testing and further recommendations will typically be " discussed/made during a face to face encounter in my office. The patient and/or family further understands it is their responsibility to keep proper follow up.     I Felicita VIERA am scribing for and in the presence of Dr. Bloch.        Felicita VIERA  Neurology      I, Dr. Bloch personally performed the services described in this documentation, as scribed by Felicita Rose in my presence, and it is both accurate and complete. I spent 45 minutes with this patient, over fifty percent was spent counseling patient on their condition, best management practices, reviewing test results and risks and benefits of treatment.

## 2020-06-05 LAB
IGA SERPL-MCNC: 138 MG/DL (ref 68–408)
IGG SERPL-MCNC: 733 MG/DL (ref 768–1632)
IGM SERPL-MCNC: 54 MG/DL (ref 35–263)

## 2020-09-01 ENCOUNTER — OFFICE VISIT (OUTPATIENT)
Dept: NEUROLOGY | Facility: MEDICAL CENTER | Age: 50
End: 2020-09-01
Payer: MEDICARE

## 2020-09-01 VITALS
BODY MASS INDEX: 22.69 KG/M2 | OXYGEN SATURATION: 93 % | SYSTOLIC BLOOD PRESSURE: 118 MMHG | HEIGHT: 72 IN | DIASTOLIC BLOOD PRESSURE: 76 MMHG | TEMPERATURE: 98.3 F | HEART RATE: 82 BPM | WEIGHT: 167.55 LBS

## 2020-09-01 DIAGNOSIS — R33.9 RETENTION OF URINE: ICD-10-CM

## 2020-09-01 DIAGNOSIS — G35 MULTIPLE SCLEROSIS (HCC): ICD-10-CM

## 2020-09-01 PROCEDURE — 99215 OFFICE O/P EST HI 40 MIN: CPT | Performed by: REGISTERED NURSE

## 2020-09-01 ASSESSMENT — FIBROSIS 4 INDEX: FIB4 SCORE: 1.45

## 2020-09-02 NOTE — PROGRESS NOTES
Chief Complaint   Patient presents with   • Follow-Up     need DMV forms       Problem List Items Addressed This Visit     Multiple sclerosis (HCC)     Here for DMV paperwork.         Urinary retention     Self cath done routinely-although a pain it is going ok.               History of present illness:  Ross Lindsay 49 y.o. male presents today for DM paperwork.  No new relapses or new symptoms .  [place]  Now performing self catheterization without any problems.      MS HISTORY  First Diagnosed:  2013  Presenting Symptoms: Numbness to feet and hands  Disease Modifying Agents in past:  Tecfidera, tysabri,    Current Disease Modifying Agent:  Ocrevus      Prior brain imaging: Yes    Currently employed: Yes    Past medical history:   Past Medical History:   Diagnosis Date   • Acid reflux    • Depression    • MS (multiple sclerosis) (HCC)        Past surgical history:   History reviewed. No pertinent surgical history.    Family history:   Family History   Problem Relation Age of Onset   • Cancer Mother         lung   • Diabetes Mother    • Heart Disease Mother    • Hypertension Mother        Social history:   Social History     Socioeconomic History   • Marital status: Single     Spouse name: Not on file   • Number of children: Not on file   • Years of education: Not on file   • Highest education level: Not on file   Occupational History   • Not on file   Social Needs   • Financial resource strain: Not on file   • Food insecurity     Worry: Not on file     Inability: Not on file   • Transportation needs     Medical: Not on file     Non-medical: Not on file   Tobacco Use   • Smoking status: Former Smoker     Packs/day: 0.50     Years: 1.00     Pack years: 0.50     Types: Cigarettes   • Smokeless tobacco: Never Used   Substance and Sexual Activity   • Alcohol use: No     Alcohol/week: 0.0 oz     Comment: occasionally   • Drug use: No   • Sexual activity: Not on file   Lifestyle   • Physical  activity     Days per week: Not on file     Minutes per session: Not on file   • Stress: Not on file   Relationships   • Social connections     Talks on phone: Not on file     Gets together: Not on file     Attends Religion service: Not on file     Active member of club or organization: Not on file     Attends meetings of clubs or organizations: Not on file     Relationship status: Not on file   • Intimate partner violence     Fear of current or ex partner: Not on file     Emotionally abused: Not on file     Physically abused: Not on file     Forced sexual activity: Not on file   Other Topics Concern   • Not on file   Social History Narrative   • Not on file       Current medications:   Current Outpatient Medications   Medication   • lisinopril (PRINIVIL) 10 MG Tab   • baclofen (LIORESAL) 10 MG Tab   • polyethylene glycol/lytes (MIRALAX) Pack   • gabapentin (NEURONTIN) 300 MG Cap   • Cholecalciferol (VITAMIN D3) 3000 units Tab   • Cyanocobalamin (VITAMIN B-12) 1000 MCG Tab   • Dalfampridine (AMPYRA) 10 MG TABLET SR 12 HR   • promethazine (PHENERGAN) 25 MG Tab   • tamsulosin (FLOMAX) 0.4 MG capsule     No current facility-administered medications for this visit.        Medication Allergy:  No Known Allergies    Review of systems:   ROS:   Constitutional: No fevers or chills.  Eyes: No blurry vision or eye pain.  ENT: No dysphagia or hearing loss.  Respiratory: No cough or shortness of breath.  Cardiovascular: No chest pain or palpitations.  GI: No nausea, vomiting, or diarrhea.  : self catheterization schedule working well  Musculoskeletal: No joint swelling or arthralgias. Able to feel gas pedal when driving with right foot.  Skin: No skin rashes.  Neuro: No headaches, dizziness, or tremors.  Endocrine: + heat intolerance  Psych: No depression or anxiety.  Heme/Lymph: No easy bruising or swollen lymph nodes.    OTHER:  Raised two children on his own, now lives with his 83 year old father and helps care take for  him.    Physical examination:   Vitals:    09/01/20 1510   BP: 118/76   BP Location: Left arm   Patient Position: Sitting   BP Cuff Size: Adult   Pulse: 82   Temp: 36.8 °C (98.3 °F)   TempSrc: Temporal   SpO2: 93%   Weight: 76 kg (167 lb 8.8 oz)   Height: 1.829 m (6')     General: Patient in no acute distress, pleasant and cooperative.  Very interactive.  HEENT: Normocephalic, no signs of acute trauma.   Neck: supple, no meningeal signs or carotid bruits. There is normal range of motion. No tenderness on exam.   Chest: clear to auscultation. No cough.   CV: RRR, no murmurs.   Abdomen: soft, flat, non distended or tender.   Skin: no signs of acute rashes or trauma.   Musculoskeletal: joints exhibit full range of motion, without any pain to palpation. There are no signs of joint or muscle swelling. There is no tenderness to deep palpation of muscles.   Psychiatric: No hallucinatory behavior. Denies symptoms of depression or suicidal ideation. Mood and affect appear normal on exam.     NEUROLOGY EXAM  Eyes: Sclera anicteric.  Neurologic:              Mental Status: Awake, alert, oriented x 3.              Speech: Fluent with normal prosody.              Memory: Able to recall recent and remote events accurately.               Concentration: Attentive. Able to focus on history and follow multi-step commands.              Fund of Knowledge: Appropriate.              Cranial Nerves:                          CN II: PERRL. No afferent pupillary defect.                          CN III, IV, VI: Good eye closure, EOMI.                           CN V: Facial sensation intact and symmetric.                           CN VII: No facial asymmetry.                           CN VIII: Hearing intact.                           CN IX and X: Palate elevates symmetrically. Normal gag reflex.                          CN XI: Symmetric shoulder shrug.                           CN XII: Tongue midline.               Sensory: Right side of face  "with less sensory to fine touch, right arm and handstring less sensitive to fine touch than left side.              Coordination: No evidence of past-pointing on finger to nose testing, no dysdiadochokinesia. Heel to shin intact.               DTR's: 2+ throughout without clonus.               Babinski: Toes down going bilaterally.              Romberg:  Loses balance with eyes closed              Movements: No tremors observed.   Musculoskeletal:               Strength: 5/5 in upper and 4/5 lower extremities bilaterally.              Gait:  Unsteady gait, unable to balance on one side with leg lifted.  OK with assistive device to left side.  Wobbly heel toe walk without assistvie device              Tone: Normal bulk and tone.              Joints: No swelling.    ANCILLARY DATA REVIEWED:      Lab Data Review:  Results for REECE MIX (MRN [id number]) as of 9/2/2020 09:42  Flow cytometry   Ref. Range 6/2/2020 11:25   Cd3 % Latest Ref Range: 62 - 87 % 89 (H)   Cd4-% T-Cell Latest Ref Range: 32 - 64 % 69 (H)   Cd8 -% Of T-Cells Latest Ref Range: 15 - 46 % 20   Cd19 % Latest Ref Range: 6 - 23 % <1   Cd4-Cd8 Ratio Latest Ref Range: 0.80 - 3.90 ratio 3.45   Cd3 Ct Latest Ref Range: 570 - 2400 cells/uL 1101   Cd4 -T4 [place] Cells Latest Ref Range: 430 - 1800 cells/uL 853   Cd8 -T8 Suppressor Cells Latest Ref Range: 210 - 1200 cells/uL 244   Cd19 Ct Latest Ref Range: 91 - 610 cells/uL <1   %Cd16 Cd56 Cd3 Latest Ref Range: 4 - 26 % 10   ABS NK CELLS Latest Ref Range: 78 - 470 cells/uL 128   Interpretation Unknown See Note       Imaging Reviewed:    IMPRESSION: Brain 06/11/2019     1.  Stable appearing moderate supratentorial white matter disease with lesion morphology consistent with multiple sclerosis. No \"active\" enhancing lesions, no definite new lesions, and no overall progression of lesion burden is evident on today's exam.  2.  Demyelinating lesions again noted in the brainstem and right middle " "cerebellar peduncle. No significant change.  3.  Demyelinating lesion in the upper cervical spinal cord at the C2 level. This was seen on MRI cervical spine 11/10/2017.     Cervical 06/11/2019  IMPRESSION:     1.  Extensive chronic appearing demyelinating lesions at multiple levels in the cervical spinal cord as detailed above. No definite new lesions or \"active\" enhancing lesions on today's exam.  2.  No significant disc bulge or protrusion, central stenosis, or foraminal stenosis at any cervical level.    IMPRESSION: Thoracic 06/11/2019     1.  Extensive longitudinally oriented demyelinating lesions involving virtually every thoracic level with substantial progression of lesion burden since 1/20/2015. No \"active\" enhancing lesions are evident on today's exam.  2.  T7-T8 minimal ventral extradural defect without compromise of the thecal sac or cord.      ASSESSMENT AND PLAN:    1. Multiple sclerosis (HCC)  Patient able to drive per neurological assessment performed today.  Will have routine follow-up for MS 09/22/2020 with Neurologist Dr. Bloch as previously scheduled.    2. Urinary retention:  Going well with self catheterization program.    Patient was seen for 60 minutes face to face of which > 50% of appointment time was spent on counseling and coordination of care regarding the above.        FOLLOW-UP:   09/22/2020 with Dr. Bloch    EDUCATION AND COUNSELING:  The patient was educated on diagnosis and prognosis. I re-interated that MS is a chronic progressive disorder for which there is currently no cure. Despite best efforts in management, the condition is likely to progress and carries significant risk for disability including physical and cognitive.     Effectiveness, indications,side effects, and safety profile of available Disease Modifying Agents (DMA’s) reviewed.     Discussed importance of regular exercise program.  I stressed the importance of sleep hygiene.    The patient will require frequent follow " up including:  Laboratory monitoring -Imaging of entire neuro-axis (brain and spinal cord) with MRI’s w/wo contrast, every year and prn   Patient/family counseled on medication compliance.     The patient understands and agrees that due to the complexity of his/her diagnosis, results of any testing and further recommendations will typically be discussed/made during a face to face encounter in my office. The patient and/or family further understands it is their responsibility to keep proper follow up.       Felicita Rose NP-C  Neurology

## 2020-09-22 ENCOUNTER — OFFICE VISIT (OUTPATIENT)
Dept: NEUROLOGY | Facility: MEDICAL CENTER | Age: 50
End: 2020-09-22
Payer: MEDICARE

## 2020-09-22 VITALS
BODY MASS INDEX: 22.62 KG/M2 | OXYGEN SATURATION: 94 % | DIASTOLIC BLOOD PRESSURE: 76 MMHG | HEIGHT: 72 IN | HEART RATE: 86 BPM | SYSTOLIC BLOOD PRESSURE: 114 MMHG | WEIGHT: 167 LBS | TEMPERATURE: 97 F

## 2020-09-22 DIAGNOSIS — G35 MULTIPLE SCLEROSIS (HCC): ICD-10-CM

## 2020-09-22 DIAGNOSIS — E53.8 VITAMIN B 12 DEFICIENCY: ICD-10-CM

## 2020-09-22 PROCEDURE — 99214 OFFICE O/P EST MOD 30 MIN: CPT | Performed by: PSYCHIATRY & NEUROLOGY

## 2020-09-22 ASSESSMENT — FIBROSIS 4 INDEX: FIB4 SCORE: 1.45

## 2020-10-02 ENCOUNTER — TELEPHONE (OUTPATIENT)
Dept: NEUROLOGY | Facility: MEDICAL CENTER | Age: 50
End: 2020-10-02

## 2020-10-02 NOTE — TELEPHONE ENCOUNTER
[phone]  Pt called regarding zoster and flu shot. If dr Bloch ok for him to follow up on these two vaccines    Called back LV to call back.

## 2020-10-05 ASSESSMENT — ENCOUNTER SYMPTOMS
BLURRED VISION: 1
FOCAL WEAKNESS: 1
DOUBLE VISION: 1
VOMITING: 0
NAUSEA: 0
EYE PAIN: 0
DIARRHEA: 0

## 2020-10-06 NOTE — ASSESSMENT & PLAN NOTE
Dani overall is doing well on Ocrevus after switch from Tysabri.  He does feel weaker at times.  He is due for laboratory testing prior to his next Ocrevus infusion.  We discussed dietary options and dalfampridine for improving his strength.

## 2020-10-06 NOTE — PROGRESS NOTES
CC: Multiple sclerosis.    HPI:     Mr. Ross Lindsay is a 48-year-old male with a history of hypertension, and vitamin B12 deficiency who returns for outpatient neurologic follow-up for multiple sclerosis.  He was last seen on May 13, 2019, at which time it was recommended he switch disease modifying treatments as he was on Tysabri with MICHELET virus antibody positive with a relatively high index of 3.03.     He had new MRI imaging on June 11, 2019, which did not show any disease activity or areas concerning for PML.  He started Ocrevus infusions on June 14, 2019, and reported that after his first infusion, his throat felt like it was closing up a bit.  He tolerated the second and third infusions well.  He denies any new symptoms related to his MS. patient feels like he has been doing better on the Ocrevus.       MS History:  Diagnosed: 2002  Lumbar puncture:  Yes OCB positive   First presentation: Tingling in bilateral hands and feet.   Disease modifying treatment:               Current:  Ocrevus-initial infusion on June 14, 2019.              Previous: Tecfidera, Tysabri from May 2016 until May 2019.  Former or other neurologist: Dr. Melissa Bloch  Last attack: March 2015, Headache, Diplopia.    Last MRI: July 2017  MICHELET virus antibody: Positive, index of 3.03 from June 1, 2018.    Review of Systems   Constitutional: Positive for malaise/fatigue.   HENT: Negative for hearing loss.    Eyes: Positive for blurred vision and double vision. Negative for pain.   Cardiovascular: Negative for chest pain.   Gastrointestinal: Negative for diarrhea, nausea and vomiting.   Neurological: Positive for focal weakness.   All other review of systems are negative.        Current Outpatient Medications:   •  lisinopril (PRINIVIL) 10 MG Tab, Take 1 tablet by mouth every day, Disp: 90 Tab, Rfl: 3  •  baclofen (LIORESAL) 10 MG Tab, Take 1 Tab by mouth 3 times a day., Disp: 90 Tab, Rfl: 11  •  polyethylene glycol/lytes (MIRALAX)  "Pack, Take 1 Packet by mouth as needed. Indications: Constipation, Disp: 90 Each, Rfl: 3  •  gabapentin (NEURONTIN) 300 MG Cap, Take 1 capsule by mouth 3 times daily., Disp: 90 Cap, Rfl: 4  •  Cholecalciferol (VITAMIN D3) 3000 units Tab, Take  by mouth., Disp: , Rfl:   •  Cyanocobalamin (VITAMIN B-12) 1000 MCG Tab, TAKE ONE TABLET BY MOUTH DAILY, Disp: 100 Tab, Rfl: 6  •  LORazepam (ATIVAN) 0.5 MG Tab, TAKE UP TO TAKE 1 TABLET BY MOUTH TWICE A DAY AS NEEDED FOR PANIC OR ANXIETY, Disp: 60 Tab, Rfl: 0  •  Dalfampridine (AMPYRA) 10 MG TABLET SR 12 HR, , Disp: , Rfl:   •  promethazine (PHENERGAN) 25 MG Tab, Take 1 Tab by mouth every 6 hours as needed for Nausea/Vomiting. (Patient not taking: Reported on 1/13/2020), Disp: 12 Tab, Rfl: 0  •  tamsulosin (FLOMAX) 0.4 MG capsule, Take 0.4 mg by mouth every day., Disp: , Rfl:   •  Dalfampridine (AMPYRA) 10 MG TABLET SR 12 HR, Take 10 mg by mouth 2 Times a Day., Disp: 60 Tab, Rfl: 11    Physical Examination:  Ambulatory Vitals  Encounter Vitals  Height: 183.5 cm (6' 0.24\")  Constitutional: Well-developed, well-nourished, good hygiene. Appears stated age.  Cardiovascular: RRR, with no murmurs, rubs or gallops. No carotid bruits.   Respiratory: Lungs CTA B/L, no W/R/R.   Abdomen: Soft Non-tender to Palpation. Non-distended.  Extremities: No peripheral edema, pedal pulses intact.   Skin: Warm, dry, intact. No rashes observed.  Eyes: Sclera anicteric. No nystagmus observed.   Neurologic:   Mental Status: Awake and alert.    Speech: Speech is fluent with normal prosody.   .           Fund of Knowledge: Appropriate   Cranial Nerves:    CN II: Pupils are equal and react appropriately. No APD noted.    CN III, IV, VI: Good eye closure.  Right ANA.    CN V: Facial sensation is intact and symmetric.     CN VII: No evidence of facial droop.     CN VIII: Hearing is grossly intact.    CN IX and X: Palate elevates symmetrically.    CN XI: Shoulder shrug is symmetric.     CN XII: Tongue is " "midline.   Sensory: Decreased temperature sensation to the level of the upper thighs bilaterally.   Coordination: There is no discoordination detected with finger tapping or finger to nose testing.        DTR's: Reflexes are 3+ in his bilateral arms and knees, 4+ in the bilateral ankles with nonsustained clonus.   Babinski: Toes are mute bilaterally.    Romberg: Deferred.   Movements: No tremors noted.  Musculoskeletal:    Strength:   Deltoids      R 5/5 L 5/5  Biceps        R 5/5 L 5/5  Triceps       R 5/5 L 5/5  Wrist Ext    R 5/5 L 5/5  Finger Flex R 5/5 L 5/5  Finger Ext   R 5/5 L 5/5  Hip Flex      R 5/5 L 5/5  Knee Flex   R 5/5 L 5/5  Knee Ext    R 5/5 L 5/5  Ankle DF    R 5/5 L 3/5 left foot drop.  Ankle PF    R 5/5 L 5/5   Gait: Steppage gait.  Uses a cane.   Tone: Increased tone in the left lower extremity.   Joints: No joint swelling.   Psychiatric: Mood and affect are appropriate.     Imaging reviewed:  Today I reviewed the patient's most recent MRI images with him in the examination room. I explained basic terminology of MRI's, verbalized my assessment, and answered his questions.     MRI brain with and without contrast from June 11, 2019.  1.  Stable appearing moderate supratentorial white matter disease with lesion morphology consistent with multiple sclerosis. No \"active\" enhancing lesions, no definite new lesions, and no overall progression of lesion burden is evident on today's exam.  2.  Demyelinating lesions again noted in the brainstem and right middle cerebellar peduncle. No significant change.  3.  Demyelinating lesion in the upper cervical spinal cord at the C2 level. This was seen on MRI cervical spine 11/10/2017.          Assessment and Plan:     Multiple sclerosis  Dani overall is doing well on Ocrevus after switch from Tysabri.  He does feel weaker at times.  He is due for laboratory testing prior to his next Ocrevus infusion.  We discussed dietary options and dalfampridine for improving " his strength.    45 minutes spent with this patient of which greater than 50% was involved in education and counseling on multiple sclerosis.  Patient was encouraged to increase his physical activity and we discussed the importance of healthy lifestyle with diet and no smoking.    Melissa P. Bloch  MS specialist.   Board Certified Neurologist.    Neurology,  [facility].   Tel: [phone]  Fax: [phone]

## 2020-10-22 PROBLEM — R33.9 RETENTION OF URINE: Status: RESOLVED | Noted: 2018-05-31 | Resolved: 2020-10-22

## 2020-11-30 ENCOUNTER — TELEPHONE (OUTPATIENT)
Dept: NEUROLOGY | Facility: MEDICAL CENTER | Age: 50
End: 2020-11-30

## 2020-11-30 DIAGNOSIS — G47.00 INSOMNIA: ICD-10-CM

## 2020-11-30 RX ORDER — ONDANSETRON 4 MG/1
4 TABLET, ORALLY DISINTEGRATING ORAL EVERY 4 HOURS PRN
Status: CANCELLED | OUTPATIENT
Start: 2020-11-30

## 2020-11-30 RX ORDER — SODIUM CHLORIDE 9 MG/ML
INJECTION, SOLUTION INTRAVENOUS CONTINUOUS
Status: CANCELLED | OUTPATIENT
Start: 2020-11-30

## 2020-11-30 RX ORDER — METHYLPREDNISOLONE SODIUM SUCCINATE 125 MG/2ML
125 INJECTION, POWDER, LYOPHILIZED, FOR SOLUTION INTRAMUSCULAR; INTRAVENOUS PRN
Status: CANCELLED | OUTPATIENT
Start: 2020-11-30

## 2020-11-30 RX ORDER — ACETAMINOPHEN 325 MG/1
650 TABLET ORAL ONCE
Status: CANCELLED | OUTPATIENT
Start: 2020-11-30

## 2020-11-30 RX ORDER — 0.9 % SODIUM CHLORIDE 0.9 %
3 VIAL (ML) INJECTION PRN
Status: CANCELLED | OUTPATIENT
Start: 2020-11-30

## 2020-11-30 RX ORDER — DIPHENHYDRAMINE HCL 25 MG
25 TABLET ORAL ONCE
Status: CANCELLED | OUTPATIENT
Start: 2020-11-30 | End: 2020-11-30

## 2020-11-30 RX ORDER — EPINEPHRINE 1 MG/ML(1)
0.5 AMPUL (ML) INJECTION PRN
Status: CANCELLED | OUTPATIENT
Start: 2020-11-30

## 2020-11-30 RX ORDER — METHYLPREDNISOLONE SODIUM SUCCINATE 125 MG/2ML
100 INJECTION, POWDER, LYOPHILIZED, FOR SOLUTION INTRAMUSCULAR; INTRAVENOUS ONCE
Status: CANCELLED | OUTPATIENT
Start: 2020-11-30

## 2020-11-30 RX ORDER — 0.9 % SODIUM CHLORIDE 0.9 %
10 VIAL (ML) INJECTION PRN
Status: CANCELLED | OUTPATIENT
Start: 2020-11-30

## 2020-11-30 RX ORDER — 0.9 % SODIUM CHLORIDE 0.9 %
VIAL (ML) INJECTION PRN
Status: CANCELLED | OUTPATIENT
Start: 2020-11-30

## 2020-11-30 RX ORDER — HEPARIN SODIUM (PORCINE) LOCK FLUSH IV SOLN 100 UNIT/ML 100 UNIT/ML
500 SOLUTION INTRAVENOUS PRN
Status: CANCELLED | OUTPATIENT
Start: 2020-11-30

## 2020-11-30 RX ORDER — DIPHENHYDRAMINE HYDROCHLORIDE 50 MG/ML
50 INJECTION INTRAMUSCULAR; INTRAVENOUS PRN
Status: CANCELLED | OUTPATIENT
Start: 2020-11-30

## 2020-11-30 NOTE — TELEPHONE ENCOUNTER
Received request via: Pharmacy    Was the patient seen in the last year in this department? Yes    Does the patient have an active prescription (recently filled or refills available) for medication(s) requested? Yes.     Pt also requesting Zanaflex

## 2020-12-02 ENCOUNTER — OUTPATIENT INFUSION SERVICES (OUTPATIENT)
Dept: ONCOLOGY | Facility: MEDICAL CENTER | Age: 50
End: 2020-12-02
Attending: PSYCHIATRY & NEUROLOGY
Payer: MEDICARE

## 2020-12-02 VITALS
WEIGHT: 174.6 LBS | TEMPERATURE: 98.5 F | HEART RATE: 89 BPM | BODY MASS INDEX: 23.65 KG/M2 | DIASTOLIC BLOOD PRESSURE: 83 MMHG | RESPIRATION RATE: 18 BRPM | OXYGEN SATURATION: 98 % | SYSTOLIC BLOOD PRESSURE: 133 MMHG | HEIGHT: 72 IN

## 2020-12-02 DIAGNOSIS — G35 MULTIPLE SCLEROSIS (HCC): ICD-10-CM

## 2020-12-02 DIAGNOSIS — E53.8 VITAMIN B 12 DEFICIENCY: ICD-10-CM

## 2020-12-02 LAB
ALBUMIN SERPL BCP-MCNC: 4.7 G/DL (ref 3.2–4.9)
ALBUMIN/GLOB SERPL: 2 G/DL
ALP SERPL-CCNC: 80 U/L (ref 30–99)
ALT SERPL-CCNC: 12 U/L (ref 2–50)
ANION GAP SERPL CALC-SCNC: 10 MMOL/L (ref 7–16)
AST SERPL-CCNC: 10 U/L (ref 12–45)
BASOPHILS # BLD AUTO: 0.8 % (ref 0–1.8)
BASOPHILS # BLD: 0.06 K/UL (ref 0–0.12)
BILIRUB SERPL-MCNC: 0.8 MG/DL (ref 0.1–1.5)
BUN SERPL-MCNC: 14 MG/DL (ref 8–22)
CALCIUM SERPL-MCNC: 9.8 MG/DL (ref 8.5–10.5)
CHLORIDE SERPL-SCNC: 101 MMOL/L (ref 96–112)
CO2 SERPL-SCNC: 26 MMOL/L (ref 20–33)
CREAT SERPL-MCNC: 0.71 MG/DL (ref 0.5–1.4)
EOSINOPHIL # BLD AUTO: 0.05 K/UL (ref 0–0.51)
EOSINOPHIL NFR BLD: 0.7 % (ref 0–6.9)
ERYTHROCYTE [DISTWIDTH] IN BLOOD BY AUTOMATED COUNT: 42.4 FL (ref 35.9–50)
GLOBULIN SER CALC-MCNC: 2.4 G/DL (ref 1.9–3.5)
GLUCOSE SERPL-MCNC: 91 MG/DL (ref 65–99)
HCT VFR BLD AUTO: 50.3 % (ref 42–52)
HGB BLD-MCNC: 16.3 G/DL (ref 14–18)
IMM GRANULOCYTES # BLD AUTO: 0.03 K/UL (ref 0–0.11)
IMM GRANULOCYTES NFR BLD AUTO: 0.4 % (ref 0–0.9)
LYMPHOCYTES # BLD AUTO: 0.93 K/UL (ref 1–4.8)
LYMPHOCYTES NFR BLD: 12.5 % (ref 22–41)
MCH RBC QN AUTO: 28.7 PG (ref 27–33)
MCHC RBC AUTO-ENTMCNC: 32.4 G/DL (ref 33.7–35.3)
MCV RBC AUTO: 88.6 FL (ref 81.4–97.8)
MONOCYTES # BLD AUTO: 0.57 K/UL (ref 0–0.85)
MONOCYTES NFR BLD AUTO: 7.7 % (ref 0–13.4)
NEUTROPHILS # BLD AUTO: 5.79 K/UL (ref 1.82–7.42)
NEUTROPHILS NFR BLD: 77.9 % (ref 44–72)
NRBC # BLD AUTO: 0 K/UL
NRBC BLD-RTO: 0 /100 WBC
PLATELET # BLD AUTO: 217 K/UL (ref 164–446)
PMV BLD AUTO: 10.6 FL (ref 9–12.9)
POTASSIUM SERPL-SCNC: 4.3 MMOL/L (ref 3.6–5.5)
PROT SERPL-MCNC: 7.1 G/DL (ref 6–8.2)
RBC # BLD AUTO: 5.68 M/UL (ref 4.7–6.1)
SODIUM SERPL-SCNC: 137 MMOL/L (ref 135–145)
VIT B12 SERPL-MCNC: 852 PG/ML (ref 211–911)
WBC # BLD AUTO: 7.4 K/UL (ref 4.8–10.8)

## 2020-12-02 PROCEDURE — 700102 HCHG RX REV CODE 250 W/ 637 OVERRIDE(OP): Performed by: PSYCHIATRY & NEUROLOGY

## 2020-12-02 PROCEDURE — 82784 ASSAY IGA/IGD/IGG/IGM EACH: CPT

## 2020-12-02 PROCEDURE — 96415 CHEMO IV INFUSION ADDL HR: CPT

## 2020-12-02 PROCEDURE — 700105 HCHG RX REV CODE 258

## 2020-12-02 PROCEDURE — 306780 HCHG STAT FOR TRANSFUSION PER CASE

## 2020-12-02 PROCEDURE — 82607 VITAMIN B-12: CPT

## 2020-12-02 PROCEDURE — A9270 NON-COVERED ITEM OR SERVICE: HCPCS | Performed by: PSYCHIATRY & NEUROLOGY

## 2020-12-02 PROCEDURE — 700111 HCHG RX REV CODE 636 W/ 250 OVERRIDE (IP)

## 2020-12-02 PROCEDURE — 96375 TX/PRO/DX INJ NEW DRUG ADDON: CPT

## 2020-12-02 PROCEDURE — 96413 CHEMO IV INFUSION 1 HR: CPT

## 2020-12-02 PROCEDURE — 700111 HCHG RX REV CODE 636 W/ 250 OVERRIDE (IP): Performed by: PSYCHIATRY & NEUROLOGY

## 2020-12-02 PROCEDURE — 85025 COMPLETE CBC W/AUTO DIFF WBC: CPT

## 2020-12-02 PROCEDURE — 700105 HCHG RX REV CODE 258: Performed by: PSYCHIATRY & NEUROLOGY

## 2020-12-02 PROCEDURE — 80053 COMPREHEN METABOLIC PANEL: CPT

## 2020-12-02 RX ORDER — 0.9 % SODIUM CHLORIDE 0.9 %
10 VIAL (ML) INJECTION PRN
Status: CANCELLED | OUTPATIENT
Start: 2020-12-16

## 2020-12-02 RX ORDER — EPINEPHRINE 1 MG/ML(1)
0.5 AMPUL (ML) INJECTION PRN
Status: CANCELLED | OUTPATIENT
Start: 2020-12-16

## 2020-12-02 RX ORDER — DIPHENHYDRAMINE HCL 25 MG
25 TABLET ORAL ONCE
Status: CANCELLED | OUTPATIENT
Start: 2020-12-16 | End: 2020-12-16

## 2020-12-02 RX ORDER — ONDANSETRON 4 MG/1
4 TABLET, ORALLY DISINTEGRATING ORAL EVERY 4 HOURS PRN
Status: CANCELLED | OUTPATIENT
Start: 2020-12-16

## 2020-12-02 RX ORDER — METHYLPREDNISOLONE SODIUM SUCCINATE 125 MG/2ML
125 INJECTION, POWDER, LYOPHILIZED, FOR SOLUTION INTRAMUSCULAR; INTRAVENOUS PRN
Status: CANCELLED | OUTPATIENT
Start: 2020-12-16

## 2020-12-02 RX ORDER — DIPHENHYDRAMINE HYDROCHLORIDE 50 MG/ML
50 INJECTION INTRAMUSCULAR; INTRAVENOUS PRN
Status: CANCELLED | OUTPATIENT
Start: 2020-12-16

## 2020-12-02 RX ORDER — SODIUM CHLORIDE 9 MG/ML
INJECTION, SOLUTION INTRAVENOUS CONTINUOUS
Status: CANCELLED | OUTPATIENT
Start: 2020-12-16

## 2020-12-02 RX ORDER — ACETAMINOPHEN 325 MG/1
650 TABLET ORAL ONCE
Status: COMPLETED | OUTPATIENT
Start: 2020-12-02 | End: 2020-12-02

## 2020-12-02 RX ORDER — 0.9 % SODIUM CHLORIDE 0.9 %
VIAL (ML) INJECTION PRN
Status: CANCELLED | OUTPATIENT
Start: 2020-12-16

## 2020-12-02 RX ORDER — 0.9 % SODIUM CHLORIDE 0.9 %
3 VIAL (ML) INJECTION PRN
Status: CANCELLED | OUTPATIENT
Start: 2020-12-16

## 2020-12-02 RX ORDER — METHYLPREDNISOLONE SODIUM SUCCINATE 125 MG/2ML
100 INJECTION, POWDER, LYOPHILIZED, FOR SOLUTION INTRAMUSCULAR; INTRAVENOUS ONCE
Status: COMPLETED | OUTPATIENT
Start: 2020-12-02 | End: 2020-12-02

## 2020-12-02 RX ORDER — HEPARIN SODIUM (PORCINE) LOCK FLUSH IV SOLN 100 UNIT/ML 100 UNIT/ML
500 SOLUTION INTRAVENOUS PRN
Status: CANCELLED | OUTPATIENT
Start: 2020-12-16

## 2020-12-02 RX ORDER — SODIUM CHLORIDE 9 MG/ML
INJECTION, SOLUTION INTRAVENOUS CONTINUOUS
Status: DISCONTINUED | OUTPATIENT
Start: 2020-12-02 | End: 2020-12-02 | Stop reason: HOSPADM

## 2020-12-02 RX ORDER — ACETAMINOPHEN 325 MG/1
650 TABLET ORAL ONCE
Status: CANCELLED | OUTPATIENT
Start: 2020-12-16

## 2020-12-02 RX ORDER — METHYLPREDNISOLONE SODIUM SUCCINATE 125 MG/2ML
100 INJECTION, POWDER, LYOPHILIZED, FOR SOLUTION INTRAMUSCULAR; INTRAVENOUS ONCE
Status: CANCELLED | OUTPATIENT
Start: 2020-12-16

## 2020-12-02 RX ORDER — DIPHENHYDRAMINE HCL 25 MG
25 TABLET ORAL ONCE
Status: COMPLETED | OUTPATIENT
Start: 2020-12-02 | End: 2020-12-02

## 2020-12-02 RX ADMIN — DIPHENHYDRAMINE HYDROCHLORIDE 25 MG: 25 TABLET ORAL at 10:10

## 2020-12-02 RX ADMIN — METHYLPREDNISOLONE SODIUM SUCCINATE 100 MG: 125 INJECTION, POWDER, FOR SOLUTION INTRAMUSCULAR; INTRAVENOUS at 10:20

## 2020-12-02 RX ADMIN — ACETAMINOPHEN 650 MG: 325 TABLET ORAL at 10:10

## 2020-12-02 RX ADMIN — OCRELIZUMAB 600 MG: 300 INJECTION INTRAVENOUS at 11:00

## 2020-12-02 RX ADMIN — SODIUM CHLORIDE: 9 INJECTION, SOLUTION INTRAVENOUS at 10:00

## 2020-12-02 ASSESSMENT — FIBROSIS 4 INDEX: FIB4 SCORE: 1.45

## 2020-12-02 NOTE — PROGRESS NOTES
Mr Parsons arrives for Ocrevus Q 6 months. Ross is in good spirits, voices no new complaints.  PIV started with good blood return. Premedications and Ocrevus infused as ordered. Ocrelizumab rate increased slowly, 40 cc every 30 min to a max rate of 200 cc/hr. Ross tolerated all treatment well, observed for 1 hr post infusion.  No signs or symptoms of adverse reaction observed or expressed.  PIV removed, catheter intact, pressure dressing applied to site. Ross DC'd home in good condition and in NAD, returns in 6 months. Appointment confirmed for next visit.

## 2020-12-04 LAB
IGA SERPL-MCNC: 140 MG/DL (ref 68–408)
IGG SERPL-MCNC: 748 MG/DL (ref 768–1632)
IGM SERPL-MCNC: 57 MG/DL (ref 35–263)

## 2020-12-04 NOTE — TELEPHONE ENCOUNTER
JULIO Castro, Ronni Ass't   Caller: Unspecified (4 days ago,  2:08 PM)             This controlled one needs to go to [facility].  MB

## 2020-12-07 RX ORDER — ZOLPIDEM TARTRATE 5 MG/1
5 TABLET ORAL NIGHTLY PRN
Qty: 30 TAB | Refills: 3 | Status: SHIPPED | OUTPATIENT
Start: 2020-12-07 | End: 2021-01-06

## 2021-02-26 DIAGNOSIS — G35 MULTIPLE SCLEROSIS (HCC): ICD-10-CM

## 2021-02-26 RX ORDER — GABAPENTIN 300 MG/1
CAPSULE ORAL
Qty: 270 CAPSULE | Refills: 3 | Status: SHIPPED | OUTPATIENT
Start: 2021-02-26 | End: 2021-12-13 | Stop reason: SDUPTHER

## 2021-04-10 DIAGNOSIS — R25.2 SPASTICITY: ICD-10-CM

## 2021-04-12 RX ORDER — BACLOFEN 10 MG/1
TABLET ORAL
Qty: 270 TABLET | Refills: 3 | Status: SHIPPED | OUTPATIENT
Start: 2021-04-12 | End: 2021-11-04 | Stop reason: SDUPTHER

## 2021-04-14 DIAGNOSIS — G35 MULTIPLE SCLEROSIS (HCC): ICD-10-CM

## 2021-04-14 RX ORDER — DALFAMPRIDINE 10 MG/1
TABLET, FILM COATED, EXTENDED RELEASE ORAL
Qty: 60 TABLET | Refills: 11 | Status: SHIPPED | OUTPATIENT
Start: 2021-04-14 | End: 2022-04-27 | Stop reason: SDUPTHER

## 2021-06-02 ENCOUNTER — OUTPATIENT INFUSION SERVICES (OUTPATIENT)
Dept: ONCOLOGY | Facility: MEDICAL CENTER | Age: 51
End: 2021-06-02
Attending: PSYCHIATRY & NEUROLOGY
Payer: MEDICARE

## 2021-06-02 VITALS
TEMPERATURE: 97 F | RESPIRATION RATE: 18 BRPM | OXYGEN SATURATION: 97 % | DIASTOLIC BLOOD PRESSURE: 70 MMHG | HEIGHT: 72 IN | SYSTOLIC BLOOD PRESSURE: 106 MMHG | BODY MASS INDEX: 23.47 KG/M2 | HEART RATE: 82 BPM | WEIGHT: 173.28 LBS

## 2021-06-02 DIAGNOSIS — G35 MULTIPLE SCLEROSIS (HCC): ICD-10-CM

## 2021-06-02 PROCEDURE — 700105 HCHG RX REV CODE 258

## 2021-06-02 PROCEDURE — 96415 CHEMO IV INFUSION ADDL HR: CPT

## 2021-06-02 PROCEDURE — 96413 CHEMO IV INFUSION 1 HR: CPT

## 2021-06-02 PROCEDURE — 700111 HCHG RX REV CODE 636 W/ 250 OVERRIDE (IP): Mod: JG | Performed by: PSYCHIATRY & NEUROLOGY

## 2021-06-02 PROCEDURE — 700105 HCHG RX REV CODE 258: Performed by: PSYCHIATRY & NEUROLOGY

## 2021-06-02 PROCEDURE — 700102 HCHG RX REV CODE 250 W/ 637 OVERRIDE(OP): Performed by: PSYCHIATRY & NEUROLOGY

## 2021-06-02 PROCEDURE — 306780 HCHG STAT FOR TRANSFUSION PER CASE

## 2021-06-02 PROCEDURE — 700111 HCHG RX REV CODE 636 W/ 250 OVERRIDE (IP)

## 2021-06-02 PROCEDURE — 96375 TX/PRO/DX INJ NEW DRUG ADDON: CPT

## 2021-06-02 PROCEDURE — A9270 NON-COVERED ITEM OR SERVICE: HCPCS | Performed by: PSYCHIATRY & NEUROLOGY

## 2021-06-02 RX ORDER — HEPARIN SODIUM (PORCINE) LOCK FLUSH IV SOLN 100 UNIT/ML 100 UNIT/ML
500 SOLUTION INTRAVENOUS PRN
Status: CANCELLED | OUTPATIENT
Start: 2021-06-02

## 2021-06-02 RX ORDER — EPINEPHRINE 1 MG/ML(1)
0.5 AMPUL (ML) INJECTION PRN
Status: CANCELLED | OUTPATIENT
Start: 2021-06-02

## 2021-06-02 RX ORDER — METHYLPREDNISOLONE SODIUM SUCCINATE 125 MG/2ML
100 INJECTION, POWDER, LYOPHILIZED, FOR SOLUTION INTRAMUSCULAR; INTRAVENOUS ONCE
Status: CANCELLED | OUTPATIENT
Start: 2021-06-02

## 2021-06-02 RX ORDER — DIPHENHYDRAMINE HCL 25 MG
25 TABLET ORAL ONCE
Status: CANCELLED | OUTPATIENT
Start: 2021-06-02 | End: 2021-06-02

## 2021-06-02 RX ORDER — ACETAMINOPHEN 325 MG/1
650 TABLET ORAL ONCE
Status: CANCELLED | OUTPATIENT
Start: 2021-06-02

## 2021-06-02 RX ORDER — METHYLPREDNISOLONE SODIUM SUCCINATE 125 MG/2ML
100 INJECTION, POWDER, LYOPHILIZED, FOR SOLUTION INTRAMUSCULAR; INTRAVENOUS ONCE
Status: COMPLETED | OUTPATIENT
Start: 2021-06-02 | End: 2021-06-02

## 2021-06-02 RX ORDER — 0.9 % SODIUM CHLORIDE 0.9 %
3 VIAL (ML) INJECTION PRN
Status: CANCELLED | OUTPATIENT
Start: 2021-06-02

## 2021-06-02 RX ORDER — SODIUM CHLORIDE 9 MG/ML
INJECTION, SOLUTION INTRAVENOUS CONTINUOUS
Status: CANCELLED | OUTPATIENT
Start: 2021-06-02

## 2021-06-02 RX ORDER — DIPHENHYDRAMINE HYDROCHLORIDE 50 MG/ML
50 INJECTION INTRAMUSCULAR; INTRAVENOUS PRN
Status: CANCELLED | OUTPATIENT
Start: 2021-06-02

## 2021-06-02 RX ORDER — ACETAMINOPHEN 325 MG/1
650 TABLET ORAL ONCE
Status: COMPLETED | OUTPATIENT
Start: 2021-06-02 | End: 2021-06-02

## 2021-06-02 RX ORDER — ZOLPIDEM TARTRATE 5 MG/1
5 TABLET ORAL NIGHTLY PRN
COMMUNITY
End: 2021-10-22 | Stop reason: SDUPTHER

## 2021-06-02 RX ORDER — METHYLPREDNISOLONE SODIUM SUCCINATE 125 MG/2ML
125 INJECTION, POWDER, LYOPHILIZED, FOR SOLUTION INTRAMUSCULAR; INTRAVENOUS PRN
Status: CANCELLED | OUTPATIENT
Start: 2021-06-02

## 2021-06-02 RX ORDER — ONDANSETRON 4 MG/1
4 TABLET, ORALLY DISINTEGRATING ORAL EVERY 4 HOURS PRN
Status: CANCELLED | OUTPATIENT
Start: 2021-06-02

## 2021-06-02 RX ORDER — DIPHENHYDRAMINE HCL 25 MG
25 TABLET ORAL ONCE
Status: COMPLETED | OUTPATIENT
Start: 2021-06-02 | End: 2021-06-02

## 2021-06-02 RX ORDER — 0.9 % SODIUM CHLORIDE 0.9 %
10 VIAL (ML) INJECTION PRN
Status: CANCELLED | OUTPATIENT
Start: 2021-06-02

## 2021-06-02 RX ORDER — 0.9 % SODIUM CHLORIDE 0.9 %
VIAL (ML) INJECTION PRN
Status: CANCELLED | OUTPATIENT
Start: 2021-06-02

## 2021-06-02 RX ADMIN — METHYLPREDNISOLONE SODIUM SUCCINATE 100 MG: 125 INJECTION, POWDER, FOR SOLUTION INTRAMUSCULAR; INTRAVENOUS at 09:58

## 2021-06-02 RX ADMIN — OCRELIZUMAB 600 MG: 300 INJECTION INTRAVENOUS at 10:30

## 2021-06-02 RX ADMIN — DIPHENHYDRAMINE HYDROCHLORIDE 25 MG: 25 TABLET ORAL at 09:54

## 2021-06-02 RX ADMIN — ACETAMINOPHEN 650 MG: 325 TABLET ORAL at 09:54

## 2021-06-02 ASSESSMENT — FIBROSIS 4 INDEX: FIB4 SCORE: 0.69

## 2021-06-02 NOTE — PROGRESS NOTES
Pt ambulatory to [facility] for [id number] Ocrevus infusion.  Pt w/ no s/s of infection, pt has no complaints at this time.  PIV established in LFA, brisk blood return noted, flushed per protocol.  Pt pre-meds given 30 minutes prior to initiation of infusion.  Ocrevus infused w/ filter in place, w/ no adverse reactions.  Pt monitored for 1 hour post-infusion w/ no adverse reactions.  PIV removed, gauze and tape dressing applied.  Pt left in care of self in NAD.  Confirmed pt's next appt.

## 2021-07-28 ENCOUNTER — OFFICE VISIT (OUTPATIENT)
Dept: NEUROLOGY | Facility: MEDICAL CENTER | Age: 51
End: 2021-07-28
Attending: REGISTERED NURSE
Payer: MEDICARE

## 2021-07-28 VITALS
TEMPERATURE: 98 F | OXYGEN SATURATION: 96 % | HEIGHT: 72 IN | SYSTOLIC BLOOD PRESSURE: 114 MMHG | HEART RATE: 64 BPM | DIASTOLIC BLOOD PRESSURE: 66 MMHG | WEIGHT: 165.34 LBS | BODY MASS INDEX: 22.4 KG/M2

## 2021-07-28 DIAGNOSIS — G35 MULTIPLE SCLEROSIS (HCC): ICD-10-CM

## 2021-07-28 PROCEDURE — 99211 OFF/OP EST MAY X REQ PHY/QHP: CPT | Performed by: REGISTERED NURSE

## 2021-07-28 PROCEDURE — 99215 OFFICE O/P EST HI 40 MIN: CPT | Performed by: REGISTERED NURSE

## 2021-07-28 ASSESSMENT — FIBROSIS 4 INDEX: FIB4 SCORE: 0.69

## 2021-07-29 ENCOUNTER — OFFICE VISIT (OUTPATIENT)
Dept: NEUROLOGY | Facility: MEDICAL CENTER | Age: 51
End: 2021-07-29
Attending: REGISTERED NURSE
Payer: MEDICARE

## 2021-07-29 VITALS
HEIGHT: 72 IN | TEMPERATURE: 97.5 F | WEIGHT: 163.4 LBS | BODY MASS INDEX: 22.13 KG/M2 | SYSTOLIC BLOOD PRESSURE: 110 MMHG | DIASTOLIC BLOOD PRESSURE: 68 MMHG | OXYGEN SATURATION: 98 % | HEART RATE: 68 BPM

## 2021-07-29 DIAGNOSIS — G35 MS (MULTIPLE SCLEROSIS) (HCC): ICD-10-CM

## 2021-07-29 PROCEDURE — 99213 OFFICE O/P EST LOW 20 MIN: CPT | Performed by: REGISTERED NURSE

## 2021-07-29 PROCEDURE — 99211 OFF/OP EST MAY X REQ PHY/QHP: CPT | Performed by: REGISTERED NURSE

## 2021-07-29 ASSESSMENT — FIBROSIS 4 INDEX: FIB4 SCORE: 0.69

## 2021-07-29 NOTE — PROGRESS NOTES
"[facility] NEUROLOGY  MULTIPLE SCLEROSIS & NEUROIMMUNOLOGY  FOLLOW-UP VISIT    DISEASE SUMMARY:  MS History:  Diagnosed: 2013  Lumbar puncture:  Yes + oliglonal bands  First presentation: Tingling bilateral hands and feet  Disease modifying treatment: tecfidera, tysabri              Current: Ocrevus June 14, 2019              Previous:   Current Neurologist:  Dr. Bloch    CC: MS    INTERVAL HISTORY:  Ross Lindsay is a 50 y.o. male with MS.    I last saw him in the  Clinic on 007/28/2021.    At that time I saw him for follow up MS.   Today, he was alone, and  provided the following interval history:    Here for DM paperwork  Physical assessment unchanged from yesterday.    A review of MS-related symptoms was notable for the following:    Fatigue: yes; bothers him from time to time but has a better handle of it.  Weakness: yes; left side generalized weakness  Numbness: yes; fingertips and bottom of feet, sometimes drop foot to right  Incoordination: yes; using assistive forearm crutch   Spasms/Spasticity: yes; both legs and tapping taking baclofen  Vision Impairment: yes; ok  Walking/Balance Problems: yes; assistive device help  Neuralgia: no  Bowel Symptoms: yes; constipation  Bladder Symptoms: yes; self catheterization  Heat Sensitivity: yes; fatigue  Depression: no  Cognitive/Memory Problems: yes; \"senior moments\"  Sexual Dysfunction: N/D  Anxiety: yes; nothing over the top    MEDICATIONS:  Current Outpatient Medications   Medication Sig   • zolpidem (AMBIEN) 5 MG Tab Take 5 mg by mouth at bedtime as needed for Sleep.   • lisinopril (PRINIVIL) 10 MG Tab Take 1 tablet by mouth every day.   • AMPYRA 10 MG TABLET SR 12 HR TAKE 1 TABLET BY MOUTH  EVERY 12 HOURS   • baclofen (LIORESAL) 10 MG Tab TAKE 1 TABLET BY MOUTH THREE TIMES A DAY   • gabapentin (NEURONTIN) 300 MG Cap Take 1 capsule by mouth 3 times daily.   • Cholecalciferol (VITAMIN D3) 3000 units Tab Take  by mouth.   • Cyanocobalamin (VITAMIN B-12) " 1000 MCG Tab TAKE ONE TABLET BY MOUTH DAILY     MEDICAL, SOCIAL, AND FAMILY HISTORY:  There is no change in the patient's ROS or medical, social, or family histories since the previous visit.    REVIEW OF SYSTEMS:  A ROS was completed.  Pertinent positives and negatives were included in the HPI, above.  All other systems were reviewed and are negative.    PHYSICAL EXAM:  General/Medical:  - NAD  - hair, skin, nails, and joints were normal  - neck was supple without Lhermitte's phenomenon  - heart rate and rhythm were regular, no carotid bruits appreciated    Neuro:  MENTAL STATUS: awake and alert; no deficits of speech or language; oriented to person, place, and time; affect was appropriate to situation    CRANIAL NERVES:    II  pupils: 3/3 to 2/2 without a relative afferent pupillary defect, discs: sharp, no red desaturation noted    III/IV/VI: versions: intact without nystagmus    V: facial sensation: symmetric to light touch    VII: facial expression: symmetric    VIII: hearing: intact to finger rub    IX/X: palate: elevates symmetrically    XI: shoulder shrug: symmetric    XII: tongue: midline    MOTOR:  - bulk: normal throughout  - tone: normal throughout  Upper Extremity Strength  (R/L)    5/5   Elbow flexion 5/5   Elbow extension 5/5   Shoulder abduction 5/5     Lower Extremity Strength  (R/L)   Hip flexion 5/   Knee extension 5/5   Knee flexion 5/5   Ankle plantarflexion 5/5   Ankle dorsiflexion 5/5     - cannot walk on toes and heels  - pronator drift: absent  - abnormal movements: none    SENSATION:  - light touch: equal to bilateral sides of body  - pinprick: N/T  - proprioception: N/T  - Romberg: absent    COORDINATION:  - finger to nose: normal, no ataxia on exam  - finger tapping: rapid and accurate, bilaterally    REFLEXES:  Reflex Right Left   BR 2+ 2+   Biceps 2+ 2+   Triceps 2+ 2+   Patellae 2+ 2+   Achilles 2+ 2+   Toes down down     GAIT:  - narrow base and normal  - heel-raised/toe-raised  "gait: intact/intact  - tandem gait: intact    QUANTITATIVE SCORES:  Timed 25-foot walk (sec): 13.55  Assistive device: none    REVIEW OF IMAGING STUDIES: I reviewed the following studies:  MRI Brain:  Date: 06/11/2019  Impression:  IMPRESSION:     1.  Stable appearing moderate supratentorial white matter disease with lesion morphology consistent with multiple sclerosis. No \"active\" enhancing lesions, no definite new lesions, and no overall progression of lesion burden is evident on today's exam.  2.  Demyelinating lesions again noted in the brainstem and right middle cerebellar peduncle. No significant change.  3.  Demyelinating lesion in the upper cervical spinal cord at the C2 level. This was seen on MRI cervical spine 11/10/2017.           MRI Cervical Spine:  Date: 06/11/2019  Impression:  IMPRESSION:     1.  Extensive chronic appearing demyelinating lesions at multiple levels in the cervical spinal cord as detailed above. No definite new lesions or \"active\" enhancing lesions on today's exam.  2.  No significant disc bulge or protrusion, central stenosis, or foraminal stenosis at any cervical level.    MRI Thoracic Spine:  Date: 06/11/2019  Impression:  IMPRESSION:     1.  Extensive longitudinally oriented demyelinating lesions involving virtually every thoracic level with substantial progression of lesion burden since 1/20/2015. No \"active\" enhancing lesions are evident on today's exam.  2.  T7-T8 minimal ventral extradural defect without compromise of the thecal sac or cord.         Last Resulted: 06/11/19  5:23 PM            REVIEW OF LABORATORY STUDIES:  Results for DOMINGUEZ REECE ARIAS (MRN [id number]) as of 7/28/2021 08:40   Ref. Range 12/2/2020 10:20   WBC Latest Ref Range: 4.8 - 10.8 K/uL 7.4   RBC Latest Ref Range: 4.70 - 6.10 M/uL 5.68   Hemoglobin Latest Ref Range: 14.0 - 18.0 g/dL 16.3   Hematocrit Latest Ref Range: 42.0 - 52.0 % 50.3   MCV Latest Ref Range: 81.4 - 97.8 fL 88.6   MCH Latest Ref Range: " 27.0 - 33.0 pg 28.7   MCHC Latest Ref Range: 33.7 - 35.3 g/dL 32.4 (L)   RDW Latest Ref Range: 35.9 - 50.0 fL 42.4   Platelet Count Latest Ref Range: 164 - 446 K/uL 217   MPV Latest Ref Range: 9.0 - 12.9 fL 10.6   Neutrophils-Polys Latest Ref Range: 44.00 - 72.00 % 77.90 (H)   Neutrophils (Absolute) Latest Ref Range: 1.82 - 7.42 K/uL 5.79   Lymphocytes Latest Ref Range: 22.00 - 41.00 % 12.50 (L)   Lymphs (Absolute) Latest Ref Range: 1.00 - 4.80 K/uL 0.93 (L)   Monocytes Latest Ref Range: 0.00 - 13.40 % 7.70   Monos (Absolute) Latest Ref Range: 0.00 - 0.85 K/uL 0.57   Eosinophils Latest Ref Range: 0.00 - 6.90 % 0.70   Eos (Absolute) Latest Ref Range: 0.00 - 0.51 K/uL 0.05   Basophils Latest Ref Range: 0.00 - 1.80 % 0.80   Baso (Absolute) Latest Ref Range: 0.00 - 0.12 K/uL 0.06   Immature Granulocytes Latest Ref Range: 0.00 - 0.90 % 0.40   Immature Granulocytes (abs) Latest Ref Range: 0.00 - 0.11 K/uL 0.03   Nucleated RBC Latest Units: /100 WBC 0.00   NRBC (Absolute) Latest Units: K/uL 0.00     Results for REECE MIX (MRN [id number]) as of 7/28/2021 08:40   Ref. Range 12/2/2020 10:20   Sodium Latest Ref Range: 135 - 145 mmol/L 137   Potassium Latest Ref Range: 3.6 - 5.5 mmol/L 4.3   Chloride Latest Ref Range: 96 - 112 mmol/L 101   Co2 Latest Ref Range: 20 - 33 mmol/L 26   Anion Gap Latest Ref Range: 7.0 - 16.0  10.0   Glucose Latest Ref Range: 65 - 99 mg/dL 91   Bun Latest Ref Range: 8 - 22 mg/dL 14   Creatinine Latest Ref Range: 0.50 - 1.40 mg/dL 0.71   GFR If  Latest Ref Range: >60 mL/min/1.73 m 2 >60   GFR If Non  Latest Ref Range: >60 mL/min/1.73 m 2 >60   Calcium Latest Ref Range: 8.5 - 10.5 mg/dL 9.8   AST(SGOT) Latest Ref Range: 12 - 45 U/L 10 (L)   ALT(SGPT) Latest Ref Range: 2 - 50 U/L 12   Alkaline Phosphatase Latest Ref Range: 30 - 99 U/L 80   Total Bilirubin Latest Ref Range: 0.1 - 1.5 mg/dL 0.8   Albumin Latest Ref Range: 3.2 - 4.9 g/dL 4.7   Total Protein Latest  Ref Range: 6.0 - 8.2 g/dL 7.1   Globulin Latest Ref Range: 1.9 - 3.5 g/dL 2.4   A-G Ratio Latest Units: g/dL 2.0     ASSESSMENT:  Ross Lindsay is a 50 y.o. male with a history of MS. He was seen by me yesterday afternoon but I was unable to do DMV paperwork at that visit.  DMV paperwork completed today.        PLAN:  DMV paperwork completed.  F/U [facility] 6 months.    1. Multiple sclerosis (HCC)    - MR-BRAIN-WITH & W/O; Future  - MR-CERVICAL SPINE-WITH & W/O; Future  - MR-THORACIC SPINE-WITH & W/O; Future  - CBC WITH DIFFERENTIAL; Future  - Comp Metabolic Panel; Future  - IMMUNOGLOBULINS A/G/M SERUM; Future  - LYMPHOCYTE SUBSET PANEL 5-TOTAL LYMPHOCYTE; Future    EDUCATION AND COUNSELIN minutes was spent of which greater than 5% was invloved with education and counseling.  The patient/family educated on diagnosis and prognosis. They understand MS is a chronic progressive disorder for which there is currently no cure. Despite best efforts in management, the condition is likely to progress and carries significant risk for disability including physical and cognitive.   -Effectiveness, indications, and safety profile of available Disease Modifying Agents (DMA’s) reviewed.   -Side effects of DMA’s were discussed, including: flu like symptoms, myalgias, injection site (infection, pain, bleeding), abnormal LFT’s, pancreatitis, weight changes, development of autoantibodies which may decrease effective of the medication, panic/anxiety attacks, abnormal blood counts (increase risk for bleeding, infections, cancer), increased risk for fetal complications (including congenital malformations, developmental/intellectual disability).    -The patient will require frequent follow up and monitoring.   -Laboratory monitoring every 3 months: CBC, CMP, thyroid panel, vitamin D, UA.   -Imaging of entire neuro-axis (brain and spinal cord) with MRI’s w/wo contrast, every six months.   -Patient/family counseled on medication  compliance.       Follow-Up:  - Six months with Dr. Bloch    Signed: JULIO Castro

## 2021-08-31 ENCOUNTER — HOSPITAL ENCOUNTER (OUTPATIENT)
Dept: RADIOLOGY | Facility: MEDICAL CENTER | Age: 51
End: 2021-08-31
Attending: REGISTERED NURSE
Payer: MEDICARE

## 2021-08-31 DIAGNOSIS — G35 MULTIPLE SCLEROSIS (HCC): ICD-10-CM

## 2021-08-31 PROCEDURE — 72157 MRI CHEST SPINE W/O & W/DYE: CPT | Mod: MG

## 2021-08-31 PROCEDURE — A9576 INJ PROHANCE MULTIPACK: HCPCS | Performed by: REGISTERED NURSE

## 2021-08-31 PROCEDURE — 70553 MRI BRAIN STEM W/O & W/DYE: CPT | Mod: MG

## 2021-08-31 PROCEDURE — 72156 MRI NECK SPINE W/O & W/DYE: CPT | Mod: MG

## 2021-08-31 PROCEDURE — 700117 HCHG RX CONTRAST REV CODE 255: Performed by: REGISTERED NURSE

## 2021-08-31 RX ADMIN — GADOTERIDOL 15 ML: 279.3 INJECTION, SOLUTION INTRAVENOUS at 12:10

## 2021-10-26 ENCOUNTER — APPOINTMENT (OUTPATIENT)
Dept: NEUROLOGY | Facility: MEDICAL CENTER | Age: 51
End: 2021-10-26
Payer: MEDICARE

## 2021-11-04 DIAGNOSIS — R25.2 SPASTICITY: ICD-10-CM

## 2021-11-04 RX ORDER — BACLOFEN 10 MG/1
TABLET ORAL
Qty: 270 TABLET | Refills: 3 | Status: SHIPPED | OUTPATIENT
Start: 2021-11-04 | End: 2022-10-24

## 2021-11-04 NOTE — TELEPHONE ENCOUNTER
Received request via: Pharmacy    Was the patient seen in the last year in this department? Yes    Does the patient have an active prescription (recently filled or refills available) for medication(s) requested? Yes.     Script to go to [facility] pharmacy

## 2021-11-08 ENCOUNTER — OFFICE VISIT (OUTPATIENT)
Dept: NEUROLOGY | Facility: MEDICAL CENTER | Age: 51
End: 2021-11-08
Attending: PSYCHIATRY & NEUROLOGY
Payer: MEDICARE

## 2021-11-08 ENCOUNTER — HOSPITAL ENCOUNTER (OUTPATIENT)
Dept: LAB | Facility: MEDICAL CENTER | Age: 51
End: 2021-11-08
Attending: REGISTERED NURSE
Payer: MEDICARE

## 2021-11-08 ENCOUNTER — HOSPITAL ENCOUNTER (OUTPATIENT)
Dept: LAB | Facility: MEDICAL CENTER | Age: 51
End: 2021-11-08
Attending: PSYCHIATRY & NEUROLOGY
Payer: MEDICARE

## 2021-11-08 VITALS
HEART RATE: 58 BPM | HEIGHT: 72 IN | DIASTOLIC BLOOD PRESSURE: 74 MMHG | OXYGEN SATURATION: 98 % | WEIGHT: 167.55 LBS | BODY MASS INDEX: 22.69 KG/M2 | TEMPERATURE: 97.7 F | SYSTOLIC BLOOD PRESSURE: 124 MMHG

## 2021-11-08 DIAGNOSIS — G35 MULTIPLE SCLEROSIS (HCC): ICD-10-CM

## 2021-11-08 DIAGNOSIS — G60.9 HEREDITARY PERIPHERAL NEUROPATHY: ICD-10-CM

## 2021-11-08 LAB
ALBUMIN SERPL BCP-MCNC: 4.9 G/DL (ref 3.2–4.9)
ALBUMIN/GLOB SERPL: 2 G/DL
ALP SERPL-CCNC: 83 U/L (ref 30–99)
ALT SERPL-CCNC: 14 U/L (ref 2–50)
ANION GAP SERPL CALC-SCNC: 11 MMOL/L (ref 7–16)
AST SERPL-CCNC: 14 U/L (ref 12–45)
BASOPHILS # BLD AUTO: 0.6 % (ref 0–1.8)
BASOPHILS # BLD: 0.04 K/UL (ref 0–0.12)
BILIRUB SERPL-MCNC: 0.9 MG/DL (ref 0.1–1.5)
BUN SERPL-MCNC: 12 MG/DL (ref 8–22)
CALCIUM SERPL-MCNC: 9.7 MG/DL (ref 8.5–10.5)
CHLORIDE SERPL-SCNC: 102 MMOL/L (ref 96–112)
CO2 SERPL-SCNC: 23 MMOL/L (ref 20–33)
CREAT SERPL-MCNC: 0.72 MG/DL (ref 0.5–1.4)
EOSINOPHIL # BLD AUTO: 0.04 K/UL (ref 0–0.51)
EOSINOPHIL NFR BLD: 0.6 % (ref 0–6.9)
ERYTHROCYTE [DISTWIDTH] IN BLOOD BY AUTOMATED COUNT: 42.6 FL (ref 35.9–50)
FOLATE SERPL-MCNC: 8.4 NG/ML
GLOBULIN SER CALC-MCNC: 2.4 G/DL (ref 1.9–3.5)
GLUCOSE SERPL-MCNC: 85 MG/DL (ref 65–99)
HCT VFR BLD AUTO: 50 % (ref 42–52)
HGB BLD-MCNC: 16.6 G/DL (ref 14–18)
IMM GRANULOCYTES # BLD AUTO: 0.03 K/UL (ref 0–0.11)
IMM GRANULOCYTES NFR BLD AUTO: 0.5 % (ref 0–0.9)
LYMPHOCYTES # BLD AUTO: 1.37 K/UL (ref 1–4.8)
LYMPHOCYTES NFR BLD: 20.8 % (ref 22–41)
MCH RBC QN AUTO: 29.2 PG (ref 27–33)
MCHC RBC AUTO-ENTMCNC: 33.2 G/DL (ref 33.7–35.3)
MCV RBC AUTO: 87.9 FL (ref 81.4–97.8)
MONOCYTES # BLD AUTO: 0.43 K/UL (ref 0–0.85)
MONOCYTES NFR BLD AUTO: 6.5 % (ref 0–13.4)
NEUTROPHILS # BLD AUTO: 4.67 K/UL (ref 1.82–7.42)
NEUTROPHILS NFR BLD: 71 % (ref 44–72)
NRBC # BLD AUTO: 0 K/UL
NRBC BLD-RTO: 0 /100 WBC
PLATELET # BLD AUTO: 212 K/UL (ref 164–446)
PMV BLD AUTO: 11 FL (ref 9–12.9)
POTASSIUM SERPL-SCNC: 4.2 MMOL/L (ref 3.6–5.5)
PROT SERPL-MCNC: 7.3 G/DL (ref 6–8.2)
RBC # BLD AUTO: 5.69 M/UL (ref 4.7–6.1)
SODIUM SERPL-SCNC: 136 MMOL/L (ref 135–145)
VIT B12 SERPL-MCNC: 815 PG/ML (ref 211–911)
WBC # BLD AUTO: 6.6 K/UL (ref 4.8–10.8)

## 2021-11-08 PROCEDURE — 86357 NK CELLS TOTAL COUNT: CPT | Mod: GA

## 2021-11-08 PROCEDURE — 99212 OFFICE O/P EST SF 10 MIN: CPT | Performed by: PSYCHIATRY & NEUROLOGY

## 2021-11-08 PROCEDURE — 82784 ASSAY IGA/IGD/IGG/IGM EACH: CPT

## 2021-11-08 PROCEDURE — 86360 T CELL ABSOLUTE COUNT/RATIO: CPT | Mod: GA

## 2021-11-08 PROCEDURE — 86355 B CELLS TOTAL COUNT: CPT | Mod: GA

## 2021-11-08 PROCEDURE — 80053 COMPREHEN METABOLIC PANEL: CPT

## 2021-11-08 PROCEDURE — 82607 VITAMIN B-12: CPT

## 2021-11-08 PROCEDURE — 86359 T CELLS TOTAL COUNT: CPT | Mod: GA

## 2021-11-08 PROCEDURE — 85025 COMPLETE CBC W/AUTO DIFF WBC: CPT

## 2021-11-08 PROCEDURE — 99215 OFFICE O/P EST HI 40 MIN: CPT | Performed by: PSYCHIATRY & NEUROLOGY

## 2021-11-08 PROCEDURE — 82746 ASSAY OF FOLIC ACID SERUM: CPT

## 2021-11-08 PROCEDURE — 36415 COLL VENOUS BLD VENIPUNCTURE: CPT

## 2021-11-08 PROCEDURE — 82306 VITAMIN D 25 HYDROXY: CPT | Mod: GA

## 2021-11-08 ASSESSMENT — FIBROSIS 4 INDEX: FIB4 SCORE: 0.69

## 2021-11-08 NOTE — ASSESSMENT & PLAN NOTE
Pt states that he has episodes when he has a slight cough and he feels like he has temerature changes one to two degrees. No recent infections of the bladder and the lungs. Pt states that he tries to stay positive.Pt states that he had to stop working in the [employer] business in 2014. Pt states that he could not pass the physical in 2014 and he started the work up for MS. The last 10 years he had issues with vertigo.

## 2021-11-09 NOTE — PROGRESS NOTES
Chief Complaint   Patient presents with   • Follow-Up     MS       Problem List Items Addressed This Visit     Multiple sclerosis (HCC)     Pt states that he has episodes when he has a slight cough and he feels like he has temerature changes one to two degrees. No recent infections of the bladder and the lungs. Pt states that he tries to stay positive.Pt states that he had to stop working in the [employer] business in 2014. Pt states that he could not pass the physical in 2014 and he started the work up for MS. The last 10 years he had issues with vertigo.         Relevant Orders    VIT B12,  FOLIC ACID    VITAMIN D,25 HYDROXY (Completed)      Other Visit Diagnoses     Hereditary peripheral neuropathy        Relevant Orders    Referral to Neurodiagnostics (EEG,EP,EMG/NCS/DBS)          History of present illness:  Ross Lindsay 50 y.o. male presents today for treatment of his MS.    Past medical history:   Past Medical History:   Diagnosis Date   • Acid reflux    • Depression    • MS (multiple sclerosis) (HCC)        Past surgical history:   History reviewed. No pertinent surgical history.    Family history:   Family History   Problem Relation Age of Onset   • Cancer Mother         lung   • Diabetes Mother    • Heart Disease Mother    • Hypertension Mother        Social history:   Social History     Socioeconomic History   • Marital status: Single     Spouse name: Not on file   • Number of children: Not on file   • Years of education: Not on file   • Highest education level: Not on file   Occupational History   • Not on file   Tobacco Use   • Smoking status: Former Smoker     Packs/day: 0.50     Years: 1.00     Pack years: 0.50     Types: Cigarettes   • Smokeless tobacco: Never Used   Substance and Sexual Activity   • Alcohol use: No     Alcohol/week: 0.0 oz     Comment: occasionally   • Drug use: No   • Sexual activity: Not on file   Other Topics Concern   • Not on file   Social History Narrative   • Not on  file     Social Determinants of Health     Financial Resource Strain:    • Difficulty of Paying Living Expenses: Not on file   Food Insecurity:    • Worried About Running Out of Food in the Last Year: Not on file   • Ran Out of Food in the Last Year: Not on file   Transportation Needs:    • Lack of Transportation (Medical): Not on file   • Lack of Transportation (Non-Medical): Not on file   Physical Activity:    • Days of Exercise per Week: Not on file   • Minutes of Exercise per Session: Not on file   Stress:    • Feeling of Stress : Not on file   Social Connections:    • Frequency of Communication with Friends and Family: Not on file   • Frequency of Social Gatherings with Friends and Family: Not on file   • Attends Evangelical Services: Not on file   • Active Member of Clubs or Organizations: Not on file   • Attends Club or Organization Meetings: Not on file   • Marital Status: Not on file   Intimate Partner Violence:    • Fear of Current or Ex-Partner: Not on file   • Emotionally Abused: Not on file   • Physically Abused: Not on file   • Sexually Abused: Not on file   Housing Stability:    • Unable to Pay for Housing in the Last Year: Not on file   • Number of Places Lived in the Last Year: Not on file   • Unstable Housing in the Last Year: Not on file       Current medications:   Current Outpatient Medications   Medication   • baclofen (LIORESAL) 10 MG Tab   • zolpidem (AMBIEN) 5 MG Tab   • lisinopril (PRINIVIL) 10 MG Tab   • AMPYRA 10 MG TABLET SR 12 HR   • gabapentin (NEURONTIN) 300 MG Cap   • Cholecalciferol (VITAMIN D3) 3000 units Tab   • Cyanocobalamin (VITAMIN B-12) 1000 MCG Tab     No current facility-administered medications for this visit.       Medication Allergy:  No Known Allergies    Review of systems:   Constitutional: denies fever, night sweats, weight loss.   Eyes: denies acute vision change, eye pain or secretion.   Ears, Nose, Mouth, Throat: denies nasal secretion, nasal bleeding, difficulty  swallowing, hearing loss, tinnitus, vertigo, ear pain, acute dental problems, oral ulcers or lesions.   Endocrine: denies recent weight changes, heat or cold intolerance, polyuria, polydypsia, polyphagia,abnormal hair growth.  Cardiovascular: denies new onset of chest pain, palpitations, syncope, or dyspnea of exertion.  Pulmonary: denies shortness of breath, new onset of cough, hemoptysis, wheezing, chest pain or flu-like symptoms.   GI: denies nausea, vomiting, diarrhea, GI bleeding, change in appetite, abdominal pain, and change in bowel habits.  : denies dysuria, urinary incontinence, hematuria.  Heme/oncology: denies history of easy bruising or bleeding. No history of cancer, DVTor PE.  Allergy/immunology: denies hives/urticaria, or itching.   Dermatologic: denies new rash, or new skin lesions.  Musculoskeletal:denies joint swelling or pain, muscle pain, neck and back pain. Neurologic: denies headaches, acute visual changes, facial droopiness, muscle weakness (focal or generalized), paresthesias, anesthesia, ataxia, change in speech or language, memory loss, abnormal movements, seizures, loss of consciousness, or episodes of confusion.   Psychiatric: denies symptoms of depression, anxiety, hallucinations, mood swings or changes, suicidal or homicidal thoughts.     Physical examination:   Vitals:    11/08/21 0949   BP: 124/74   BP Location: Right arm   Patient Position: Sitting   BP Cuff Size: Adult   Pulse: (!) 58   Temp: 36.5 °C (97.7 °F)   TempSrc: Temporal   SpO2: 98%   Weight: 76 kg (167 lb 8.8 oz)   Height: 1.829 m (6')     General: Patient in no acute distress, pleasant and cooperative.  HEENT: Normocephalic, no signs of acute trauma.   Neck: supple, no meningeal signs or carotid bruits. There is normal range of motion. No tenderness on exam.   Chest: clear to auscultation. No cough.   CV: RRR, no murmurs.   Skin: no signs of acute rashes or trauma.   Musculoskeletal: joints exhibit full range of  motion, without any pain to palpation. There are no signs of joint or muscle swelling. There is no tenderness to deep palpation of muscles.   Psychiatric: No hallucinatory behavior. Denies symptoms of depression or suicidal ideation. Mood and affect appear normal on exam.     NEUROLOGICAL EXAM:   Mental status, orientation: Awake, alert and fully oriented.   Speech and language: speech is clear and fluent. The patient is able to name, repeat and comprehend.   Memory: There is intact recollection of recent and remote events.   Cranial nerve exam: Pupils are 3-4 mm bilaterally and equally reactive to light and accommodation. Visual fields are intact by confrontation. Fundoscopic exam was unremarkable. There is no nystagmus on primary or secondary gaze. Intact full EOM in all directions of gaze. Face appears symmetric. Sensation in the face is intact to light touch. Uvula is midline. Palate elevates symmetrically. Tongue is midline and without any signs of tongue biting or fasciculations. Sternocleidomastoid muscles exhibit is normal strength bilaterally. Shoulder shrug is intact bilaterally.   Motor exam: Strength is 5/5 in all extremities. Tone is normal. No abnormal movements were seen on exam.   Sensory exam reveals normal sense of light touch, proprioception, vibration and pinprick in all extremities.   Deep tendon reflexes:  2+ throughout. Plantar responses are flexor. There is no clonus.   Coordination: shows a normal finger-nose-finger. Normal rapidly alternating movements.   Gait: The patient was able to get up from seated position on first attempt without requiring assistance. Found to be steady when walking. Movements were fluid with normal arm swing. The patient was able to turn without difficulties or tendency to fall. Romberg examination positive      ANCILLARY DATA REVIEWED:     Lab Data Review:  Recent Results (from the past 24 hour(s))   Comp Metabolic Panel    Collection Time: 11/08/21 12:12 PM    Result Value Ref Range    Sodium 136 135 - 145 mmol/L    Potassium 4.2 3.6 - 5.5 mmol/L    Chloride 102 96 - 112 mmol/L    Co2 23 20 - 33 mmol/L    Anion Gap 11.0 7.0 - 16.0    Glucose 85 65 - 99 mg/dL    Bun 12 8 - 22 mg/dL    Creatinine 0.72 0.50 - 1.40 mg/dL    Calcium 9.7 8.5 - 10.5 mg/dL    AST(SGOT) 14 12 - 45 U/L    ALT(SGPT) 14 2 - 50 U/L    Alkaline Phosphatase 83 30 - 99 U/L    Total Bilirubin 0.9 0.1 - 1.5 mg/dL    Albumin 4.9 3.2 - 4.9 g/dL    Total Protein 7.3 6.0 - 8.2 g/dL    Globulin 2.4 1.9 - 3.5 g/dL    A-G Ratio 2.0 g/dL   CBC WITH DIFFERENTIAL    Collection Time: 11/08/21 12:12 PM   Result Value Ref Range    WBC 6.6 4.8 - 10.8 K/uL    RBC 5.69 4.70 - 6.10 M/uL    Hemoglobin 16.6 14.0 - 18.0 g/dL    Hematocrit 50.0 42.0 - 52.0 %    MCV 87.9 81.4 - 97.8 fL    MCH 29.2 27.0 - 33.0 pg    MCHC 33.2 (L) 33.7 - 35.3 g/dL    RDW 42.6 35.9 - 50.0 fL    Platelet Count 212 164 - 446 K/uL    MPV 11.0 9.0 - 12.9 fL    Neutrophils-Polys 71.00 44.00 - 72.00 %    Lymphocytes 20.80 (L) 22.00 - 41.00 %    Monocytes 6.50 0.00 - 13.40 %    Eosinophils 0.60 0.00 - 6.90 %    Basophils 0.60 0.00 - 1.80 %    Immature Granulocytes 0.50 0.00 - 0.90 %    Nucleated RBC 0.00 /100 WBC    Neutrophils (Absolute) 4.67 1.82 - 7.42 K/uL    Lymphs (Absolute) 1.37 1.00 - 4.80 K/uL    Monos (Absolute) 0.43 0.00 - 0.85 K/uL    Eos (Absolute) 0.04 0.00 - 0.51 K/uL    Baso (Absolute) 0.04 0.00 - 0.12 K/uL    Immature Granulocytes (abs) 0.03 0.00 - 0.11 K/uL    NRBC (Absolute) 0.00 K/uL   ESTIMATED GFR    Collection Time: 11/08/21 12:12 PM   Result Value Ref Range    GFR If African American >60 >60 mL/min/1.73 m 2    GFR If Non African American >60 >60 mL/min/1.73 m 2   VITAMIN B12    Collection Time: 11/08/21 12:13 PM   Result Value Ref Range    Vitamin B12 -True Cobalamin 815 211 - 911 pg/mL   FOLATE    Collection Time: 11/08/21 12:13 PM   Result Value Ref Range    Folate -Folic Acid 8.4 >4.0 ng/mL       Records reviewed: i reviewed  his labs.      Imaging: MRI of the brain           ASSESSMENT AND PLAN:    1. Multiple sclerosis (HCC)  Continue Ocrevus infusions every 6 months and check labd prior to his next infusion.  - VIT B12,  FOLIC ACID  - VITAMIN D,25 HYDROXY; Future    2. Hereditary peripheral neuropathy  Pt is developing bilateral hammer toes and increased numbness in his bilateral legs. Patient states that his mother had a peripheral neuropathy.  - Referral to Neurodiagnostics (EEG,EP,EMG/NCS/DBS)        FOLLOW-UP:   3-4 months      My total time spent caring for the patient on the day of the encounter was  65 minutes.   This does not include time spent on separately billable procedures/tests.    EDUCATION AND COUNSELING:  -Discussed regular exercise program and prevention of cardiovascular disease, including stroke.   -Discussed healthy lifestyle, including: healthy diet (rich in fruits, vegetables, nuts and healthy oils); proper hydration, and adequate sleep hygiene (allowing 7-8 hrs of overnight sleep).        Melissa Bloch, MD  Clinical  of Neurology [facility].   Diplomate in Neurology.   Office: [phone]  Fax: [phone]

## 2021-11-10 LAB
25(OH)D3 SERPL-MCNC: 58 NG/ML (ref 30–80)
ANNOTATION COMMENT IMP: ABNORMAL
CD19 CELLS NFR SPEC: 0 % (ref 6–23)
CD3 CELLS # BLD: 1290 CELLS/UL (ref 570–2400)
CD3 CELLS NFR SPEC: 91 % (ref 62–87)
CD3+CD4+ CELLS # BLD: 1009 CELLS/UL (ref 430–1800)
CD3+CD4+ CELLS NFR BLD: 71 % (ref 32–64)
CD3+CD4+ CELLS/CD3+CD8+ CLL BLD: 3.74 RATIO (ref 0.8–3.9)
CD3+CD8+ CELLS # BLD: 271 CELLS/UL (ref 210–1200)
CD3+CD8+ CELLS NFR SPEC: 19 % (ref 15–46)
CD3-CD16+CD56+ CELLS # SPEC: 130 CELLS/UL (ref 78–470)
CD3-CD16+CD56+ CELLS NFR SPEC: 9 % (ref 4–26)
CELLS.CD3-CD19+ [#/VOLUME] IN BLOOD: 0 CELLS/UL (ref 91–610)
IGA SERPL-MCNC: 144 MG/DL (ref 68–408)
IGG SERPL-MCNC: 685 MG/DL (ref 768–1632)
IGM SERPL-MCNC: 65 MG/DL (ref 35–263)

## 2021-11-16 DIAGNOSIS — G60.9 HEREDITARY PERIPHERAL NEUROPATHY: ICD-10-CM

## 2021-12-01 RX ORDER — DIPHENHYDRAMINE HCL 25 MG
50 TABLET ORAL ONCE
Status: CANCELLED | OUTPATIENT
Start: 2021-12-01 | End: 2021-12-01

## 2021-12-01 RX ORDER — 0.9 % SODIUM CHLORIDE 0.9 %
10 VIAL (ML) INJECTION PRN
Status: CANCELLED | OUTPATIENT
Start: 2021-12-01

## 2021-12-01 RX ORDER — EPINEPHRINE 1 MG/ML(1)
0.5 AMPUL (ML) INJECTION PRN
Status: CANCELLED | OUTPATIENT
Start: 2021-12-01

## 2021-12-01 RX ORDER — DIPHENHYDRAMINE HYDROCHLORIDE 50 MG/ML
50 INJECTION INTRAMUSCULAR; INTRAVENOUS PRN
Status: CANCELLED | OUTPATIENT
Start: 2021-12-01

## 2021-12-01 RX ORDER — ACETAMINOPHEN 325 MG/1
650 TABLET ORAL ONCE
Status: CANCELLED | OUTPATIENT
Start: 2021-12-01

## 2021-12-01 RX ORDER — 0.9 % SODIUM CHLORIDE 0.9 %
VIAL (ML) INJECTION PRN
Status: CANCELLED | OUTPATIENT
Start: 2021-12-01

## 2021-12-01 RX ORDER — 0.9 % SODIUM CHLORIDE 0.9 %
3 VIAL (ML) INJECTION PRN
Status: CANCELLED | OUTPATIENT
Start: 2021-12-01

## 2021-12-01 RX ORDER — SODIUM CHLORIDE 9 MG/ML
INJECTION, SOLUTION INTRAVENOUS CONTINUOUS
Status: CANCELLED | OUTPATIENT
Start: 2021-12-01

## 2021-12-01 RX ORDER — METHYLPREDNISOLONE SODIUM SUCCINATE 125 MG/2ML
100 INJECTION, POWDER, LYOPHILIZED, FOR SOLUTION INTRAMUSCULAR; INTRAVENOUS ONCE
Status: CANCELLED | OUTPATIENT
Start: 2021-12-01

## 2021-12-01 RX ORDER — ONDANSETRON 2 MG/ML
8 INJECTION INTRAMUSCULAR; INTRAVENOUS EVERY 4 HOURS PRN
Status: CANCELLED | OUTPATIENT
Start: 2021-12-01

## 2021-12-01 RX ORDER — ONDANSETRON 4 MG/1
4 TABLET, ORALLY DISINTEGRATING ORAL EVERY 4 HOURS PRN
Status: CANCELLED | OUTPATIENT
Start: 2021-12-01

## 2021-12-01 RX ORDER — METHYLPREDNISOLONE SODIUM SUCCINATE 125 MG/2ML
125 INJECTION, POWDER, LYOPHILIZED, FOR SOLUTION INTRAMUSCULAR; INTRAVENOUS PRN
Status: CANCELLED | OUTPATIENT
Start: 2021-12-01

## 2021-12-01 RX ORDER — HEPARIN SODIUM (PORCINE) LOCK FLUSH IV SOLN 100 UNIT/ML 100 UNIT/ML
500 SOLUTION INTRAVENOUS PRN
Status: CANCELLED | OUTPATIENT
Start: 2021-12-01

## 2021-12-02 ENCOUNTER — OUTPATIENT INFUSION SERVICES (OUTPATIENT)
Dept: ONCOLOGY | Facility: MEDICAL CENTER | Age: 51
End: 2021-12-02
Attending: PSYCHIATRY & NEUROLOGY
Payer: MEDICARE

## 2021-12-02 VITALS
BODY MASS INDEX: 23.24 KG/M2 | RESPIRATION RATE: 18 BRPM | HEART RATE: 76 BPM | OXYGEN SATURATION: 97 % | DIASTOLIC BLOOD PRESSURE: 86 MMHG | WEIGHT: 166.01 LBS | SYSTOLIC BLOOD PRESSURE: 124 MMHG | HEIGHT: 71 IN | TEMPERATURE: 97.5 F

## 2021-12-02 DIAGNOSIS — G35 MULTIPLE SCLEROSIS (HCC): ICD-10-CM

## 2021-12-02 LAB
HBV CORE AB SERPL QL IA: NONREACTIVE
HBV SURFACE AG SER QL: NORMAL

## 2021-12-02 PROCEDURE — 96375 TX/PRO/DX INJ NEW DRUG ADDON: CPT

## 2021-12-02 PROCEDURE — 700111 HCHG RX REV CODE 636 W/ 250 OVERRIDE (IP): Performed by: REGISTERED NURSE

## 2021-12-02 PROCEDURE — 87340 HEPATITIS B SURFACE AG IA: CPT

## 2021-12-02 PROCEDURE — 86704 HEP B CORE ANTIBODY TOTAL: CPT

## 2021-12-02 PROCEDURE — 96413 CHEMO IV INFUSION 1 HR: CPT

## 2021-12-02 PROCEDURE — 96415 CHEMO IV INFUSION ADDL HR: CPT

## 2021-12-02 PROCEDURE — 96366 THER/PROPH/DIAG IV INF ADDON: CPT

## 2021-12-02 PROCEDURE — A9270 NON-COVERED ITEM OR SERVICE: HCPCS | Performed by: REGISTERED NURSE

## 2021-12-02 PROCEDURE — 700111 HCHG RX REV CODE 636 W/ 250 OVERRIDE (IP)

## 2021-12-02 PROCEDURE — 700105 HCHG RX REV CODE 258

## 2021-12-02 PROCEDURE — 700105 HCHG RX REV CODE 258: Performed by: REGISTERED NURSE

## 2021-12-02 PROCEDURE — 96365 THER/PROPH/DIAG IV INF INIT: CPT | Mod: XU

## 2021-12-02 PROCEDURE — 700102 HCHG RX REV CODE 250 W/ 637 OVERRIDE(OP): Performed by: REGISTERED NURSE

## 2021-12-02 RX ORDER — EPINEPHRINE 1 MG/ML(1)
0.5 AMPUL (ML) INJECTION PRN
Status: CANCELLED | OUTPATIENT
Start: 2021-12-16

## 2021-12-02 RX ORDER — 0.9 % SODIUM CHLORIDE 0.9 %
10 VIAL (ML) INJECTION PRN
Status: CANCELLED | OUTPATIENT
Start: 2021-12-02

## 2021-12-02 RX ORDER — ONDANSETRON 2 MG/ML
8 INJECTION INTRAMUSCULAR; INTRAVENOUS EVERY 4 HOURS PRN
Status: CANCELLED | OUTPATIENT
Start: 2021-12-16

## 2021-12-02 RX ORDER — DIPHENHYDRAMINE HYDROCHLORIDE 50 MG/ML
50 INJECTION INTRAMUSCULAR; INTRAVENOUS PRN
Status: CANCELLED | OUTPATIENT
Start: 2021-12-02

## 2021-12-02 RX ORDER — DIPHENHYDRAMINE HCL 25 MG
50 TABLET ORAL ONCE
Status: CANCELLED | OUTPATIENT
Start: 2021-12-16 | End: 2021-12-16

## 2021-12-02 RX ORDER — METHYLPREDNISOLONE SODIUM SUCCINATE 125 MG/2ML
100 INJECTION, POWDER, LYOPHILIZED, FOR SOLUTION INTRAMUSCULAR; INTRAVENOUS ONCE
Status: CANCELLED | OUTPATIENT
Start: 2021-12-02

## 2021-12-02 RX ORDER — 0.9 % SODIUM CHLORIDE 0.9 %
VIAL (ML) INJECTION PRN
Status: CANCELLED | OUTPATIENT
Start: 2021-12-16

## 2021-12-02 RX ORDER — DIPHENHYDRAMINE HCL 25 MG
50 TABLET ORAL ONCE
Status: COMPLETED | OUTPATIENT
Start: 2021-12-02 | End: 2021-12-02

## 2021-12-02 RX ORDER — METHYLPREDNISOLONE SODIUM SUCCINATE 125 MG/2ML
125 INJECTION, POWDER, LYOPHILIZED, FOR SOLUTION INTRAMUSCULAR; INTRAVENOUS PRN
Status: CANCELLED | OUTPATIENT
Start: 2021-12-16

## 2021-12-02 RX ORDER — ONDANSETRON 4 MG/1
4 TABLET, ORALLY DISINTEGRATING ORAL EVERY 4 HOURS PRN
Status: CANCELLED | OUTPATIENT
Start: 2021-12-16

## 2021-12-02 RX ORDER — EPINEPHRINE 1 MG/ML(1)
0.5 AMPUL (ML) INJECTION PRN
Status: CANCELLED | OUTPATIENT
Start: 2021-12-02

## 2021-12-02 RX ORDER — ACETAMINOPHEN 325 MG/1
650 TABLET ORAL ONCE
Status: CANCELLED | OUTPATIENT
Start: 2021-12-02

## 2021-12-02 RX ORDER — METHYLPREDNISOLONE SODIUM SUCCINATE 125 MG/2ML
100 INJECTION, POWDER, LYOPHILIZED, FOR SOLUTION INTRAMUSCULAR; INTRAVENOUS ONCE
Status: CANCELLED | OUTPATIENT
Start: 2021-12-16

## 2021-12-02 RX ORDER — HEPARIN SODIUM (PORCINE) LOCK FLUSH IV SOLN 100 UNIT/ML 100 UNIT/ML
500 SOLUTION INTRAVENOUS PRN
Status: CANCELLED | OUTPATIENT
Start: 2021-12-16

## 2021-12-02 RX ORDER — DIPHENHYDRAMINE HCL 25 MG
25 TABLET ORAL ONCE
Status: CANCELLED | OUTPATIENT
Start: 2021-12-02 | End: 2021-12-02

## 2021-12-02 RX ORDER — ACETAMINOPHEN 325 MG/1
650 TABLET ORAL ONCE
Status: COMPLETED | OUTPATIENT
Start: 2021-12-02 | End: 2021-12-02

## 2021-12-02 RX ORDER — 0.9 % SODIUM CHLORIDE 0.9 %
10 VIAL (ML) INJECTION PRN
Status: CANCELLED | OUTPATIENT
Start: 2021-12-16

## 2021-12-02 RX ORDER — ACETAMINOPHEN 325 MG/1
650 TABLET ORAL ONCE
Status: CANCELLED | OUTPATIENT
Start: 2021-12-16

## 2021-12-02 RX ORDER — ONDANSETRON 4 MG/1
4 TABLET, ORALLY DISINTEGRATING ORAL EVERY 4 HOURS PRN
Status: CANCELLED | OUTPATIENT
Start: 2021-12-02

## 2021-12-02 RX ORDER — METHYLPREDNISOLONE SODIUM SUCCINATE 125 MG/2ML
125 INJECTION, POWDER, LYOPHILIZED, FOR SOLUTION INTRAMUSCULAR; INTRAVENOUS PRN
Status: CANCELLED | OUTPATIENT
Start: 2021-12-02

## 2021-12-02 RX ORDER — HEPARIN SODIUM (PORCINE) LOCK FLUSH IV SOLN 100 UNIT/ML 100 UNIT/ML
500 SOLUTION INTRAVENOUS PRN
Status: CANCELLED | OUTPATIENT
Start: 2021-12-02

## 2021-12-02 RX ORDER — METHYLPREDNISOLONE SODIUM SUCCINATE 125 MG/2ML
100 INJECTION, POWDER, LYOPHILIZED, FOR SOLUTION INTRAMUSCULAR; INTRAVENOUS ONCE
Status: COMPLETED | OUTPATIENT
Start: 2021-12-02 | End: 2021-12-02

## 2021-12-02 RX ORDER — SODIUM CHLORIDE 9 MG/ML
INJECTION, SOLUTION INTRAVENOUS CONTINUOUS
Status: CANCELLED | OUTPATIENT
Start: 2021-12-02

## 2021-12-02 RX ORDER — 0.9 % SODIUM CHLORIDE 0.9 %
3 VIAL (ML) INJECTION PRN
Status: CANCELLED | OUTPATIENT
Start: 2021-12-16

## 2021-12-02 RX ORDER — 0.9 % SODIUM CHLORIDE 0.9 %
3 VIAL (ML) INJECTION PRN
Status: CANCELLED | OUTPATIENT
Start: 2021-12-02

## 2021-12-02 RX ORDER — DIPHENHYDRAMINE HYDROCHLORIDE 50 MG/ML
50 INJECTION INTRAMUSCULAR; INTRAVENOUS PRN
Status: CANCELLED | OUTPATIENT
Start: 2021-12-16

## 2021-12-02 RX ORDER — SODIUM CHLORIDE 9 MG/ML
INJECTION, SOLUTION INTRAVENOUS CONTINUOUS
Status: CANCELLED | OUTPATIENT
Start: 2021-12-16

## 2021-12-02 RX ORDER — 0.9 % SODIUM CHLORIDE 0.9 %
VIAL (ML) INJECTION PRN
Status: CANCELLED | OUTPATIENT
Start: 2021-12-02

## 2021-12-02 RX ADMIN — METHYLPREDNISOLONE SODIUM SUCCINATE 100 MG: 125 INJECTION, POWDER, FOR SOLUTION INTRAMUSCULAR; INTRAVENOUS at 09:58

## 2021-12-02 RX ADMIN — DIPHENHYDRAMINE HYDROCHLORIDE 50 MG: 25 TABLET ORAL at 09:58

## 2021-12-02 RX ADMIN — ACETAMINOPHEN 650 MG: 325 TABLET ORAL at 09:58

## 2021-12-02 RX ADMIN — SODIUM CHLORIDE 600 MG: 9 INJECTION, SOLUTION INTRAVENOUS at 10:31

## 2021-12-02 ASSESSMENT — FIBROSIS 4 INDEX: FIB4 SCORE: 0.92

## 2021-12-02 NOTE — PROGRESS NOTES
Dani is here for every 6 month ocrelizumab (OCREVUS). Denies receiving any live vaccines in the past 4 weeks. Denies any signs or symptoms of an active infection. PIV established; baseline Hepatitis B screening drawn as ordered. Pre-medications given per MAR. ocrelizumab (OCREVUS) given per MAR; initiated 100 ml/hr for the 15 minutes; increase 200 ml/hour for the next 15 minutes; increase to 250 mg/hour for the next 30 minutes; increase 300 ml hour for the remaining for 60 minutes. No adverse reaction during 1 hour after the infusion is complete. PIV flushed with normal saline and removed. Next appointment scheduled. Discharged to self care; no apparent distress noted.

## 2021-12-13 DIAGNOSIS — G35 MULTIPLE SCLEROSIS (HCC): ICD-10-CM

## 2021-12-14 RX ORDER — GABAPENTIN 300 MG/1
300 CAPSULE ORAL 3 TIMES DAILY
Qty: 270 CAPSULE | Refills: 3 | Status: SHIPPED | OUTPATIENT
Start: 2021-12-14 | End: 2022-07-14 | Stop reason: SDUPTHER

## 2021-12-29 ENCOUNTER — APPOINTMENT (OUTPATIENT)
Dept: NEUROLOGY | Facility: MEDICAL CENTER | Age: 51
End: 2021-12-29
Attending: SPECIALIST
Payer: MEDICARE

## 2022-01-18 ENCOUNTER — NON-PROVIDER VISIT (OUTPATIENT)
Dept: NEUROLOGY | Facility: MEDICAL CENTER | Age: 52
End: 2022-01-18
Attending: PSYCHIATRY & NEUROLOGY
Payer: MEDICARE

## 2022-01-18 DIAGNOSIS — G60.9 HEREDITARY PERIPHERAL NEUROPATHY: ICD-10-CM

## 2022-01-18 PROCEDURE — 95909 NRV CNDJ TST 5-6 STUDIES: CPT | Mod: 26 | Performed by: SPECIALIST

## 2022-01-18 PROCEDURE — 95886 MUSC TEST DONE W/N TEST COMP: CPT | Mod: 26 | Performed by: SPECIALIST

## 2022-01-18 PROCEDURE — 95886 MUSC TEST DONE W/N TEST COMP: CPT | Performed by: SPECIALIST

## 2022-01-18 PROCEDURE — 95909 NRV CNDJ TST 5-6 STUDIES: CPT | Performed by: SPECIALIST

## 2022-01-18 NOTE — PROCEDURES
NERVE CONDUCTION STUDIES AND ELECTROMYOGRAPHY REPORT        01/18/22      Referring provider: Riki Nelson P.A.-C.      SUMMARY OF PATIENT'S CLINICAL HISTORY,PHYSICAL EXAM, AND RATIONALE FOR TESTING:    Mr. Ross Lindsay 51 y.o. presenting with bilateral lower extremity numbness and the patient with a longstanding history of multiple sclerosis and a reported history of a mother with a neuropathy when elderly.    Past Medical History is significant for :   Past Medical History:   Diagnosis Date   • Acid reflux    • Depression    • MS (multiple sclerosis) (HCC)        The electrodiagnostic studies were performed to evaluate for possible peripheral neuropathy.      ELECTRODIAGNOSTIC EXAMINATION:  Nerve conduction studies (NCS) and electromyography (EMG) are utilized to evaluate direct or indirect damage to the peripheral nervous system. NCS are performed to measure the nerve(s) response(s) to electrostimulation across a given nerve segment. EMG evaluates the passive and active electrical activity of the muscle(s) in question.  Muscles are innervated by specific peripheral nerves and roots. Often times, several nerves the muscle to be examined in order to determine the presence or absence of the disease process. Furthermore, nerves and muscles may need to be tested in a [id number] comparison, as well as in additional extremities, as this may be crucial in characterizing the extent of the disease process, which may be diffuse or isolated and of varying degree of severity. The extent of the neurodiagnostic exam is justified as it may help arrive to a proper diagnosis, which ultimately may contribute to better management of the patient. Therefore, the nerves to muscles examined during the study were medically necessary.    Unless otherwise noted, temperature of the extremity(s) study was monitored before and during the examination and remained between 32 and 36 degrees C for the upper extremities, and  between 30 and 36 degrees C for the lower extremities.      NERVE CONDUCTION STUDIES:  Sensory nerves:   - Bilateral Sural nerves were tested. The responses were within normal limits.    Motor nerves:   - Bilateral Tibial nerves were examined. Recording electrodes placed at the Abductor     Hallucis muscles. The responses were within normal limits.  - Bilateral Deep Peroneal motor nerves were examined. Recording electrodes were placed at the Extensor Digitorum Brevis muscles.The responses were within normal limits.     No temporal dispersion or conduction block observed in any of the motor nerves examined.        ELECTROMYOGRAPHY:  The study was performed the concentric needle electrode. Fibrillation and fasciculation activity is graded by convention from none (0) to continuous (4+).  Needle electrode examination was performed in the following muscles: Bilateral vastus lateralis, tibialis anterior, gastrocnemius, extensor digitorum brevis, abductor hallucis.  The muscles tested demonstrated normal insertional activity, normal motor unit morphology and recruitment. There were no elements suggestive of active denervation.          Nerve Conduction Studies     Stim Site NR Onset (ms) Norm Onset (ms) O-P Amp (µV) Norm O-P Amp Site1 Site2 Delta-P (ms) Dist (cm) Andrew (m/s) Norm Andrew (m/s)   Left Sural Anti Sensory (Lat Mall)   Calf    2.6 <4.6 10.5 >4 Calf Lat Mall 3.2 14.0 44 >40   Right Sural Anti Sensory (Lat Mall)   Calf    2.8 <4.6 15.6 >4 Calf Lat Mall 3.5 14.0 40 >40        Stim Site NR Onset (ms) Norm Onset (ms) O-P Amp (mV) Norm O-P Amp Site1 Site2 Delta-0 (ms) Dist (cm) Andrew (m/s) Norm Andrew (m/s)   Left Peroneal EDB Motor (Ext Dig Brev)   Ankle    3.7 <6 6.2 >2.5 B Fib Ankle 6.8 33.0 49 >40   B Fib    10.5  5.5  Poplt B Fib 1.4 10.0 71    Poplt    11.9  5.4          Right Peroneal EDB Motor (Ext Dig Brev)   Ankle    3.8 <6 7.2 >2.5 B Fib Ankle 7.1 35.0 49 >40   B Fib    10.9  6.4  Poplt B Fib 1.1 10.0 91    Poplt     12.0  6.3          Left Tibial Motor (Abd Werner Brev)   Ankle    3.4 <6 16.1 >4 Knee Ankle 8.1 40.0 49 >40   Knee    11.5  12.0          Right Tibial Motor (Abd Werner Brev)   Ankle    3.4 <6 17.4 >4 Knee Ankle 8.2 41.0 50 >40   Knee    11.6  16.0                          Electromyography     Side Muscle Nerve Root Ins Act Fibs Psw Amp Dur Poly Recrt Int Pat Comment   Right VastusLat Femoral L2-4 Nml Nml Nml Nml Nml 0 Nml Nml    Right AntTibialis Dp Br Fibular L4-5 Nml Nml Nml Nml Nml 0 Nml Nml    Right Gastroc Tibial S1-2 Nml Nml Nml Nml Nml 0 Nml Nml    Right Ext Dig Brev Dp Br Fibular L5, S1 Nml Nml Nml Nml Nml 0 Nml Nml    Right AbdHallucis MedPlantar S1-2 Nml Nml Nml Nml Nml 0 Nml Nml    Left VastusLat Femoral L2-4 Nml Nml Nml Nml Nml 0 Nml Nml    Left AntTibialis Dp Br Fibular L4-5 Nml Nml Nml Nml Nml 0 Nml Nml    Left Gastroc Tibial S1-2 Nml Nml Nml Nml Nml 0 Nml Nml    Left Ext Dig Brev Dp Br Fibular L5, S1 Nml Nml Nml Nml Nml 0 Nml Nml    Left AbdHallucis MedPlantar S1-2 Nml Nml Nml Nml Nml 0 Nml Nml          DIAGNOSTIC INTERPRETATION:  Extensive electrodiagnostic studies were performed to the bilateral lower extremities.  The results are as follows:    1.  Normal EMG and nerve conduction studies right lower extremity.    2.  Normal EMG and nerve conduction studies left lower extremity.    CLINICAL DISCUSSION:   The patient exhibited normal motor and sensory nerve conduction studies in both lower extremities and a normal electromyographic examination of both lower extremities.  There was no evidence of an axonal or demyelinating neuropathy.  Clinical correlation is suggested.      TREV Oconnor M.D.

## 2022-02-08 ENCOUNTER — APPOINTMENT (OUTPATIENT)
Dept: NEUROLOGY | Facility: MEDICAL CENTER | Age: 52
End: 2022-02-08
Attending: PSYCHIATRY & NEUROLOGY
Payer: MEDICARE

## 2022-04-27 DIAGNOSIS — G35 MULTIPLE SCLEROSIS (HCC): ICD-10-CM

## 2022-04-27 RX ORDER — DALFAMPRIDINE 10 MG/1
TABLET, FILM COATED, EXTENDED RELEASE ORAL
Qty: 60 TABLET | Refills: 11 | Status: SHIPPED | OUTPATIENT
Start: 2022-04-27 | End: 2023-01-31 | Stop reason: SDUPTHER

## 2022-04-27 NOTE — TELEPHONE ENCOUNTER
Received request via: Pharmacy    Was the patient seen in the last year in this department? Yes    lv   11/8/21  fv    NONE    Does the patient have an active prescription (recently filled or refills available) for medication(s) requested? YES

## 2022-06-02 ENCOUNTER — OUTPATIENT INFUSION SERVICES (OUTPATIENT)
Dept: ONCOLOGY | Facility: MEDICAL CENTER | Age: 52
End: 2022-06-02
Attending: REGISTERED NURSE
Payer: MEDICARE

## 2022-06-02 VITALS
TEMPERATURE: 98 F | HEART RATE: 67 BPM | DIASTOLIC BLOOD PRESSURE: 83 MMHG | SYSTOLIC BLOOD PRESSURE: 126 MMHG | RESPIRATION RATE: 18 BRPM | WEIGHT: 169.97 LBS | HEIGHT: 72 IN | OXYGEN SATURATION: 99 % | BODY MASS INDEX: 23.02 KG/M2

## 2022-06-02 DIAGNOSIS — G35 MULTIPLE SCLEROSIS (HCC): ICD-10-CM

## 2022-06-02 PROCEDURE — 96366 THER/PROPH/DIAG IV INF ADDON: CPT

## 2022-06-02 PROCEDURE — 700102 HCHG RX REV CODE 250 W/ 637 OVERRIDE(OP): Performed by: REGISTERED NURSE

## 2022-06-02 PROCEDURE — 96375 TX/PRO/DX INJ NEW DRUG ADDON: CPT

## 2022-06-02 PROCEDURE — 700105 HCHG RX REV CODE 258

## 2022-06-02 PROCEDURE — 700111 HCHG RX REV CODE 636 W/ 250 OVERRIDE (IP): Performed by: REGISTERED NURSE

## 2022-06-02 PROCEDURE — 96365 THER/PROPH/DIAG IV INF INIT: CPT

## 2022-06-02 PROCEDURE — A9270 NON-COVERED ITEM OR SERVICE: HCPCS | Performed by: REGISTERED NURSE

## 2022-06-02 PROCEDURE — 700111 HCHG RX REV CODE 636 W/ 250 OVERRIDE (IP)

## 2022-06-02 PROCEDURE — 700105 HCHG RX REV CODE 258: Performed by: REGISTERED NURSE

## 2022-06-02 RX ORDER — ACETAMINOPHEN 325 MG/1
650 TABLET ORAL ONCE
Status: COMPLETED | OUTPATIENT
Start: 2022-06-02 | End: 2022-06-02

## 2022-06-02 RX ORDER — HEPARIN SODIUM (PORCINE) LOCK FLUSH IV SOLN 100 UNIT/ML 100 UNIT/ML
500 SOLUTION INTRAVENOUS PRN
Status: CANCELLED | OUTPATIENT
Start: 2022-06-16

## 2022-06-02 RX ORDER — METHYLPREDNISOLONE SODIUM SUCCINATE 125 MG/2ML
100 INJECTION, POWDER, LYOPHILIZED, FOR SOLUTION INTRAMUSCULAR; INTRAVENOUS ONCE
Status: CANCELLED | OUTPATIENT
Start: 2022-06-16

## 2022-06-02 RX ORDER — METHYLPREDNISOLONE SODIUM SUCCINATE 125 MG/2ML
125 INJECTION, POWDER, LYOPHILIZED, FOR SOLUTION INTRAMUSCULAR; INTRAVENOUS PRN
Status: CANCELLED | OUTPATIENT
Start: 2022-06-16

## 2022-06-02 RX ORDER — ONDANSETRON 4 MG/1
4 TABLET, ORALLY DISINTEGRATING ORAL EVERY 4 HOURS PRN
Status: CANCELLED | OUTPATIENT
Start: 2022-06-16

## 2022-06-02 RX ORDER — ACETAMINOPHEN 325 MG/1
650 TABLET ORAL ONCE
Status: CANCELLED | OUTPATIENT
Start: 2022-06-16

## 2022-06-02 RX ORDER — DIPHENHYDRAMINE HCL 25 MG
50 TABLET ORAL ONCE
Status: CANCELLED | OUTPATIENT
Start: 2022-06-16 | End: 2022-06-16

## 2022-06-02 RX ORDER — 0.9 % SODIUM CHLORIDE 0.9 %
10 VIAL (ML) INJECTION PRN
Status: CANCELLED | OUTPATIENT
Start: 2022-06-16

## 2022-06-02 RX ORDER — DIPHENHYDRAMINE HCL 25 MG
50 TABLET ORAL ONCE
Status: COMPLETED | OUTPATIENT
Start: 2022-06-02 | End: 2022-06-02

## 2022-06-02 RX ORDER — SODIUM CHLORIDE 9 MG/ML
INJECTION, SOLUTION INTRAVENOUS CONTINUOUS
Status: DISCONTINUED | OUTPATIENT
Start: 2022-06-02 | End: 2022-06-02 | Stop reason: HOSPADM

## 2022-06-02 RX ORDER — 0.9 % SODIUM CHLORIDE 0.9 %
VIAL (ML) INJECTION PRN
Status: CANCELLED | OUTPATIENT
Start: 2022-06-16

## 2022-06-02 RX ORDER — 0.9 % SODIUM CHLORIDE 0.9 %
3 VIAL (ML) INJECTION PRN
Status: CANCELLED | OUTPATIENT
Start: 2022-06-16

## 2022-06-02 RX ORDER — SODIUM CHLORIDE 9 MG/ML
INJECTION, SOLUTION INTRAVENOUS CONTINUOUS
Status: CANCELLED | OUTPATIENT
Start: 2022-06-16

## 2022-06-02 RX ORDER — METHYLPREDNISOLONE SODIUM SUCCINATE 125 MG/2ML
100 INJECTION, POWDER, LYOPHILIZED, FOR SOLUTION INTRAMUSCULAR; INTRAVENOUS ONCE
Status: COMPLETED | OUTPATIENT
Start: 2022-06-02 | End: 2022-06-02

## 2022-06-02 RX ORDER — ONDANSETRON 2 MG/ML
8 INJECTION INTRAMUSCULAR; INTRAVENOUS EVERY 4 HOURS PRN
Status: CANCELLED | OUTPATIENT
Start: 2022-06-16

## 2022-06-02 RX ORDER — EPINEPHRINE 1 MG/ML(1)
0.5 AMPUL (ML) INJECTION PRN
Status: CANCELLED | OUTPATIENT
Start: 2022-06-16

## 2022-06-02 RX ORDER — DIPHENHYDRAMINE HYDROCHLORIDE 50 MG/ML
50 INJECTION INTRAMUSCULAR; INTRAVENOUS PRN
Status: CANCELLED | OUTPATIENT
Start: 2022-06-16

## 2022-06-02 RX ADMIN — DIPHENHYDRAMINE HCL 50 MG: 25 TABLET ORAL at 10:11

## 2022-06-02 RX ADMIN — ACETAMINOPHEN 650 MG: 325 TABLET ORAL at 10:11

## 2022-06-02 RX ADMIN — SODIUM CHLORIDE: 9 INJECTION, SOLUTION INTRAVENOUS at 10:12

## 2022-06-02 RX ADMIN — METHYLPREDNISOLONE SODIUM SUCCINATE 100 MG: 125 INJECTION, POWDER, FOR SOLUTION INTRAMUSCULAR; INTRAVENOUS at 10:12

## 2022-06-02 RX ADMIN — SODIUM CHLORIDE 600 MG: 9 INJECTION, SOLUTION INTRAVENOUS at 10:44

## 2022-06-02 ASSESSMENT — FIBROSIS 4 INDEX: FIB4 SCORE: .935414346693485346

## 2022-06-02 NOTE — PROGRESS NOTES
Mr. Lindsay arrived to the infusion center for Ocrevus. He is in great spirits today. Denies any recent vaccines or infections. PIV established without difficulty. Premedication given. Ocrevus titrated: 100 ml/hr for 15 minutes, then 200 ml/hr for 15 minutes, then 250 ml/hr for 30 minutes, and then 300 ml/hr for the rest of the infusion. He tolerated infusion well with no reactions noted. PIV flushed, discontinued, with tip intact, and site covered with gauze and coban. Left infusion center with no apparent distress. Confirmed next appointment

## 2022-07-07 ENCOUNTER — OFFICE VISIT (OUTPATIENT)
Dept: NEUROLOGY | Facility: MEDICAL CENTER | Age: 52
End: 2022-07-07
Attending: REGISTERED NURSE
Payer: MEDICARE

## 2022-07-07 VITALS
WEIGHT: 169 LBS | HEIGHT: 72 IN | BODY MASS INDEX: 22.89 KG/M2 | SYSTOLIC BLOOD PRESSURE: 124 MMHG | DIASTOLIC BLOOD PRESSURE: 76 MMHG | HEART RATE: 78 BPM | OXYGEN SATURATION: 96 %

## 2022-07-07 DIAGNOSIS — G35 MS (MULTIPLE SCLEROSIS) (HCC): ICD-10-CM

## 2022-07-07 DIAGNOSIS — Z51.81 THERAPEUTIC DRUG MONITORING: ICD-10-CM

## 2022-07-07 DIAGNOSIS — G60.9 HEREDITARY PERIPHERAL NEUROPATHY: ICD-10-CM

## 2022-07-07 DIAGNOSIS — F51.01 PRIMARY INSOMNIA: ICD-10-CM

## 2022-07-07 DIAGNOSIS — R25.2 SPASTICITY: ICD-10-CM

## 2022-07-07 DIAGNOSIS — E55.9 VITAMIN D DEFICIENCY: ICD-10-CM

## 2022-07-07 PROBLEM — M25.561 PAIN IN RIGHT KNEE: Status: ACTIVE | Noted: 2022-07-07

## 2022-07-07 PROCEDURE — 99211 OFF/OP EST MAY X REQ PHY/QHP: CPT | Performed by: REGISTERED NURSE

## 2022-07-07 PROCEDURE — 99215 OFFICE O/P EST HI 40 MIN: CPT | Performed by: REGISTERED NURSE

## 2022-07-07 ASSESSMENT — FIBROSIS 4 INDEX: FIB4 SCORE: .935414346693485346

## 2022-07-07 ASSESSMENT — PATIENT HEALTH QUESTIONNAIRE - PHQ9: CLINICAL INTERPRETATION OF PHQ2 SCORE: 0

## 2022-07-07 NOTE — PROGRESS NOTES
"[facility] NEUROLOGY  MULTIPLE SCLEROSIS & NEUROIMMUNOLOGY  FOLLOW-UP VISIT    DISEASE SUMMARY:    MS History:  Diagnosed: 2013  Lumbar puncture:  Yes + oliglonal bands  First presentation: Tingling bilateral hands and feet  Disease modifying treatment: tecfidera, tysabri              Current: Ocrevus June 14, 2019              Previous:   Current Neurologist:  Dr. Bloch       CC: Multiple Sclerosis    INTERVAL HISTORY:  Ross Lindsay is a 51 y.o. male with multiple Sclerosis.  Dr Bloch  last saw Mr. Lindsay in the [facility] on 11/08/2021.    At that time  It was recommended  to continue Ocrevus.  Today, he was alone, and  provided the following interval history:    No new symptoms. In good spirits.  Heat is an issue and he has a cooling vest. He gets very fatigued in heat.  Fell out of bed ~three months ago      June 2 last Ocrevus-Next on 12/08/ A review of MS-related symptoms was notable for the following:    Fatigue: yes; worse in heat, but getting good sleep at night a solid 8  Weakness: yes; left side generalized weakness  Numbness: yes; fingertips and bottom of feet, sometimes drop foot to right  Incoordination: yes; using assistive forearm crutch   Spasms/Spasticity: yes; both legs and  taking baclofen  Vision Impairment: yes; ok  Walking/Balance Problems: yes; assistive device help  Neuralgia: no  Bowel Symptoms: yes; constipation, incontinent of stool-diet.    Bladder Symptoms: yes; self catheterization  Heat Sensitivity: yes; fatigue  Depression: no  Cognitive/Memory Problems: yes; \"senior moments\"  Sexual Dysfunction: N/D  Anxiety: yes; nothing over the top     MEDICATIONS:  Current Outpatient Medications   Medication Sig   • lisinopril (PRINIVIL) 10 MG Tab Take 1 Tablet by mouth every day.   • AMPYRA 10 MG TABLET SR 12 HR TAKE 1 TABLET BY MOUTH  EVERY 12 HOURS   • gabapentin (NEURONTIN) 300 MG Cap Take 1 Capsule by mouth 3 times a day.   • baclofen (LIORESAL) 10 MG Tab TAKE 1 TABLET BY MOUTH " THREE TIMES A DAY   • Cholecalciferol (VITAMIN D3) 3000 units Tab Take  by mouth.   • Cyanocobalamin (VITAMIN B-12) 1000 MCG Tab TAKE ONE TABLET BY MOUTH DAILY     MEDICAL, SOCIAL, AND FAMILY HISTORY:  There is no change in the patient's ROS or medical, social, or family histories since the previous visit.    REVIEW OF SYSTEMS:  A ROS was completed.  Pertinent positives and negatives were included in the HPI, above.  All other systems were reviewed and are negative.    PHYSICAL EXAM:  General/Medical:  - NAD  - hair, skin, nails, and joints were normal  - neck was supple without Lhermitte's phenomenon  - heart rate and rhythm were regular, no carotid bruits appreciated    Neuro:  MENTAL STATUS: awake and alert; no deficits of speech or language; oriented to person, place, and time; affect was appropriate to situation    CRANIAL NERVES:    II:  fields: intact to confrontation, pupils: 3/3 to 2/2 without a relative afferent pupillary defect, discs: sharp, no red desaturation noted    III/IV/VI: versions: intact without nystagmus    V: facial sensation: symmetric to light touch    VII: facial expression: symmetric    VIII: hearing: intact to finger rub    IX/X: palate: elevates symmetrically    XI: shoulder shrug: symmetric    XII: tongue: midline    MOTOR:  - bulk: normal throughout  - tone: normal throughout  Upper Extremity Strength  (R/L)    5/5   Elbow flexion 5/5   Elbow extension 5/5   Shoulder abduction 5/5     Lower Extremity Strength  (R/L)   Hip flexion 5/5   Knee extension 5/5   Knee flexion 5/5   Ankle plantarflexion 5/5   Ankle dorsiflexion 5/5     - can walk on toes and heels  - pronator drift: absent  - abnormal movements: none    SENSATION:  - light touch: equal to bilateral  - vibration (R/L, seconds): 0/0 at the great toes  - pinprick: N/t  - proprioception: N/T  - Romberg: sways    COORDINATION:  - finger to nose: normal, no ataxia on exam  - finger tapping: rapid and accurate,  "bilaterally    REFLEXES:  Reflex Right Left   BR 2+ 2+   Biceps 2+ 2+   Triceps 2+ 2+   Patellae 2+ 2+   Achilles 2+ 2+   Toes mute mute     GAIT:  - wide base and picks up right knee when walking  - heel-raised/toe-raised gait: intact/intact  - tandem gait: assistive device to one arm     QUANTITATIVE SCORES:  Timed 25-foot walk (sec): 11.85  Assistive device: none    REVIEW OF IMAGING STUDIES: I reviewed the following studies:  MRI Brain:  Date: 08/31/2021  W/o and w/ contrast?: yes  Impression:  IMPRESSION:     1.  Stable appearing supratentorial white matter disease with lesion morphology characteristic of multiple sclerosis. No \"active\" enhancing lesions, definite new lesions, or overall progression of lesion burden compared with prior study from 6/11/2019.  2.  Posterior fossa lesions are also stable with no new or enhancing lesions.  3.  Chronic demyelinating lesion in the upper cervical spinal cord at the C2 level is barely within the field of view. No appreciable change.    MRI Cervical Spine:  Date: 08/31/2021  W/o and w/ contrast?: yes  Impression:  IMPRESSION:     1.  Stable multifocal areas of increased T2 spinal cord signal involving the cervical spine extending from C2 through T1. No evidence of abnormal enhancement.     2.  Mild multilevel degenerative disc disease. No significant central canal narrowing.    MRI Thoracic Spine:  Date: 08/31/2021    Impression:  IMPRESSION:     1.  Stable diffuse multifocal areas of bright T2 spinal cord signal scattered throughout the entire thoracic spinal cord, unchanged from comparison. No abnormal enhancement seen following contrast administration.        2.  Disc bulge at T7-8, no significant central canal narrowing.    REVIEW OF LABORATORY STUDIES:   Latest Reference Range & Units 11/08/21 12:12   WBC 4.8 - 10.8 K/uL 6.6   RBC 4.70 - 6.10 M/uL 5.69   Hemoglobin 14.0 - 18.0 g/dL 16.6   Hematocrit 42.0 - 52.0 % 50.0   MCV 81.4 - 97.8 fL 87.9   MCH 27.0 - 33.0 pg " 29.2   MCHC 33.7 - 35.3 g/dL 33.2 (L)   RDW 35.9 - 50.0 fL 42.6   Platelet Count 164 - 446 K/uL 212   MPV 9.0 - 12.9 fL 11.0   Neutrophils-Polys 44.00 - 72.00 % 71.00   Neutrophils (Absolute) 1.82 - 7.42 K/uL 4.67   Lymphocytes 22.00 - 41.00 % 20.80 (L)   Lymphs (Absolute) 1.00 - 4.80 K/uL 1.37   Monocytes 0.00 - 13.40 % 6.50   Monos (Absolute) 0.00 - 0.85 K/uL 0.43   Eosinophils 0.00 - 6.90 % 0.60   Eos (Absolute) 0.00 - 0.51 K/uL 0.04   Basophils 0.00 - 1.80 % 0.60   Baso (Absolute) 0.00 - 0.12 K/uL 0.04   Immature Granulocytes 0.00 - 0.90 % 0.50   Immature Granulocytes (abs) 0.00 - 0.11 K/uL 0.03   Nucleated RBC /100 WBC 0.00   NRBC (Absolute) K/uL 0.00   (L): Data is abnormally low   Latest Reference Range & Units 11/08/21 12:12   Sodium 135 - 145 mmol/L 136   Potassium 3.6 - 5.5 mmol/L 4.2   Chloride 96 - 112 mmol/L 102   Co2 20 - 33 mmol/L 23   Anion Gap 7.0 - 16.0  11.0   Glucose 65 - 99 mg/dL 85   Bun 8 - 22 mg/dL 12   Creatinine 0.50 - 1.40 mg/dL 0.72   GFR If African American >60 mL/min/1.73 m 2 >60   GFR If Non African American >60 mL/min/1.73 m 2 >60   Calcium 8.5 - 10.5 mg/dL 9.7   AST(SGOT) 12 - 45 U/L 14   ALT(SGPT) 2 - 50 U/L 14   Alkaline Phosphatase 30 - 99 U/L 83   Total Bilirubin 0.1 - 1.5 mg/dL 0.9   Albumin 3.2 - 4.9 g/dL 4.9   Total Protein 6.0 - 8.2 g/dL 7.3   Globulin 1.9 - 3.5 g/dL 2.4   A-G Ratio g/dL 2.0      Latest Reference Range & Units 11/08/21 12:13   25-Hydroxy   Vitamin D 25 30 - 80 ng/mL 58   Folate -Folic Acid >4.0 ng/mL 8.4   Vitamin B12 -True Cobalamin 211 - 911 pg/mL 815     ASSESSMENT:  Ross Lindsay is a 51 y.o. male with a history of Multiple Sclerosis. No new symptoms/complaints.   Half mile to one mile uses two QOD.  Did have covid January 2021,  Fever x 1 day only.  Otherwise did well.  Cooking good clean meals.  Keeping active. Very positive attitude.    Works at [email or web address] as a [employer].    PLAN:  DMV paperwork completed.      1. Hereditary peripheral  neuropathy      2. MS (multiple sclerosis) (HCC)  - CBC WITH DIFFERENTIAL; Future  - Comp Metabolic Panel; Future  - IMMUNOGLOBULINS A/G/M SERUM; Future    3. Primary insomnia  Resolved sleeping well    4. Spasticity  Intermittent but not disabling    5. Therapeutic drug monitoring    - CBC WITH DIFFERENTIAL; Future  - Comp Metabolic Panel; Future  - IMMUNOGLOBULINS A/G/M SERUM; Future    EDUCATION AND COUNSELIN minutes was spent of which greater than 5% was invloved with education and counseling.  The patient/family educated on diagnosis and prognosis. They understand MS is a chronic progressive disorder for which there is currently no cure. Despite best efforts in management, the condition is likely to progress and carries significant risk for disability including physical and cognitive.   -Effectiveness, indications, and safety profile of available Disease Modifying Agents (DMA’s) reviewed.   -Side effects of DMA’s were discussed, including: flu like symptoms, myalgias, injection site (infection, pain, bleeding), abnormal LFT’s, pancreatitis, weight changes, development of autoantibodies which may decrease effective of the medication, panic/anxiety attacks, abnormal blood counts (increase risk for bleeding, infections, cancer), increased risk for fetal complications (including congenital malformations, developmental/intellectual disability).    -The patient will require frequent follow up and monitoring.   -Laboratory monitoring every 3 months: CBC, CMP, thyroid panel, vitamin D, UA.   -Imaging of entire neuro-axis (brain and spinal cord) with MRI’s w/wo contrast, every six months.   -Patient/family counseled on medication compliance.     -  Follow-Up:  - Six months    Signed: JULIO Castro

## 2022-07-14 DIAGNOSIS — G35 MULTIPLE SCLEROSIS (HCC): ICD-10-CM

## 2022-07-14 RX ORDER — GABAPENTIN 300 MG/1
300 CAPSULE ORAL 3 TIMES DAILY
Qty: 270 CAPSULE | Refills: 3 | Status: SHIPPED | OUTPATIENT
Start: 2022-07-14 | End: 2023-01-31 | Stop reason: SDUPTHER

## 2022-10-22 DIAGNOSIS — R25.2 SPASTICITY: ICD-10-CM

## 2022-10-24 RX ORDER — BACLOFEN 10 MG/1
TABLET ORAL
Qty: 270 TABLET | Refills: 2 | Status: SHIPPED | OUTPATIENT
Start: 2022-10-24 | End: 2023-01-31 | Stop reason: SDUPTHER

## 2022-12-08 ENCOUNTER — OUTPATIENT INFUSION SERVICES (OUTPATIENT)
Dept: ONCOLOGY | Facility: MEDICAL CENTER | Age: 52
End: 2022-12-08
Attending: REGISTERED NURSE
Payer: MEDICARE

## 2022-12-08 VITALS
RESPIRATION RATE: 18 BRPM | TEMPERATURE: 97.2 F | WEIGHT: 165.57 LBS | SYSTOLIC BLOOD PRESSURE: 103 MMHG | HEIGHT: 72 IN | BODY MASS INDEX: 22.43 KG/M2 | DIASTOLIC BLOOD PRESSURE: 81 MMHG | HEART RATE: 89 BPM | OXYGEN SATURATION: 97 %

## 2022-12-08 DIAGNOSIS — G35 MULTIPLE SCLEROSIS (HCC): ICD-10-CM

## 2022-12-08 PROCEDURE — A9270 NON-COVERED ITEM OR SERVICE: HCPCS | Performed by: PSYCHIATRY & NEUROLOGY

## 2022-12-08 PROCEDURE — 700102 HCHG RX REV CODE 250 W/ 637 OVERRIDE(OP): Performed by: PSYCHIATRY & NEUROLOGY

## 2022-12-08 PROCEDURE — 700111 HCHG RX REV CODE 636 W/ 250 OVERRIDE (IP)

## 2022-12-08 PROCEDURE — 700105 HCHG RX REV CODE 258

## 2022-12-08 PROCEDURE — 700105 HCHG RX REV CODE 258: Performed by: REGISTERED NURSE

## 2022-12-08 PROCEDURE — 96365 THER/PROPH/DIAG IV INF INIT: CPT

## 2022-12-08 PROCEDURE — 700111 HCHG RX REV CODE 636 W/ 250 OVERRIDE (IP): Performed by: PSYCHIATRY & NEUROLOGY

## 2022-12-08 PROCEDURE — 700111 HCHG RX REV CODE 636 W/ 250 OVERRIDE (IP): Mod: JG | Performed by: REGISTERED NURSE

## 2022-12-08 PROCEDURE — 96375 TX/PRO/DX INJ NEW DRUG ADDON: CPT

## 2022-12-08 PROCEDURE — 96366 THER/PROPH/DIAG IV INF ADDON: CPT

## 2022-12-08 RX ORDER — ACETAMINOPHEN 325 MG/1
650 TABLET ORAL ONCE
Status: COMPLETED | OUTPATIENT
Start: 2022-12-08 | End: 2022-12-08

## 2022-12-08 RX ORDER — METHYLPREDNISOLONE SODIUM SUCCINATE 125 MG/2ML
100 INJECTION, POWDER, LYOPHILIZED, FOR SOLUTION INTRAMUSCULAR; INTRAVENOUS ONCE
Status: CANCELLED | OUTPATIENT
Start: 2022-12-08

## 2022-12-08 RX ORDER — 0.9 % SODIUM CHLORIDE 0.9 %
10 VIAL (ML) INJECTION PRN
Status: CANCELLED | OUTPATIENT
Start: 2023-05-28

## 2022-12-08 RX ORDER — DIPHENHYDRAMINE HCL 25 MG
50 TABLET ORAL ONCE
Status: COMPLETED | OUTPATIENT
Start: 2022-12-08 | End: 2022-12-08

## 2022-12-08 RX ORDER — METHYLPREDNISOLONE SODIUM SUCCINATE 125 MG/2ML
100 INJECTION, POWDER, LYOPHILIZED, FOR SOLUTION INTRAMUSCULAR; INTRAVENOUS ONCE
Status: CANCELLED | OUTPATIENT
Start: 2023-05-28

## 2022-12-08 RX ORDER — ONDANSETRON 2 MG/ML
8 INJECTION INTRAMUSCULAR; INTRAVENOUS EVERY 4 HOURS PRN
Status: CANCELLED | OUTPATIENT
Start: 2023-05-28

## 2022-12-08 RX ORDER — EPINEPHRINE 1 MG/ML(1)
0.5 AMPUL (ML) INJECTION PRN
Status: CANCELLED | OUTPATIENT
Start: 2023-05-28

## 2022-12-08 RX ORDER — 0.9 % SODIUM CHLORIDE 0.9 %
10 VIAL (ML) INJECTION PRN
Status: CANCELLED | OUTPATIENT
Start: 2022-12-08

## 2022-12-08 RX ORDER — HEPARIN SODIUM (PORCINE) LOCK FLUSH IV SOLN 100 UNIT/ML 100 UNIT/ML
500 SOLUTION INTRAVENOUS PRN
Status: CANCELLED | OUTPATIENT
Start: 2023-05-28

## 2022-12-08 RX ORDER — ACETAMINOPHEN 325 MG/1
650 TABLET ORAL ONCE
Status: CANCELLED | OUTPATIENT
Start: 2023-05-28

## 2022-12-08 RX ORDER — 0.9 % SODIUM CHLORIDE 0.9 %
VIAL (ML) INJECTION PRN
Status: CANCELLED | OUTPATIENT
Start: 2023-05-28

## 2022-12-08 RX ORDER — METHYLPREDNISOLONE SODIUM SUCCINATE 125 MG/2ML
125 INJECTION, POWDER, LYOPHILIZED, FOR SOLUTION INTRAMUSCULAR; INTRAVENOUS PRN
Status: CANCELLED | OUTPATIENT
Start: 2023-05-28

## 2022-12-08 RX ORDER — ONDANSETRON 4 MG/1
4 TABLET, ORALLY DISINTEGRATING ORAL EVERY 4 HOURS PRN
Status: CANCELLED | OUTPATIENT
Start: 2022-12-08

## 2022-12-08 RX ORDER — DIPHENHYDRAMINE HCL 25 MG
50 TABLET ORAL ONCE
Status: CANCELLED | OUTPATIENT
Start: 2023-05-28 | End: 2023-05-28

## 2022-12-08 RX ORDER — HEPARIN SODIUM (PORCINE) LOCK FLUSH IV SOLN 100 UNIT/ML 100 UNIT/ML
500 SOLUTION INTRAVENOUS PRN
Status: CANCELLED | OUTPATIENT
Start: 2022-12-08

## 2022-12-08 RX ORDER — DIPHENHYDRAMINE HCL 25 MG
50 TABLET ORAL ONCE
Status: CANCELLED | OUTPATIENT
Start: 2022-12-08 | End: 2022-12-08

## 2022-12-08 RX ORDER — ACETAMINOPHEN 325 MG/1
650 TABLET ORAL ONCE
Status: CANCELLED | OUTPATIENT
Start: 2022-12-08

## 2022-12-08 RX ORDER — 0.9 % SODIUM CHLORIDE 0.9 %
VIAL (ML) INJECTION PRN
Status: CANCELLED | OUTPATIENT
Start: 2022-12-08

## 2022-12-08 RX ORDER — METHYLPREDNISOLONE SODIUM SUCCINATE 125 MG/2ML
100 INJECTION, POWDER, LYOPHILIZED, FOR SOLUTION INTRAMUSCULAR; INTRAVENOUS ONCE
Status: COMPLETED | OUTPATIENT
Start: 2022-12-08 | End: 2022-12-08

## 2022-12-08 RX ORDER — SODIUM CHLORIDE 9 MG/ML
INJECTION, SOLUTION INTRAVENOUS CONTINUOUS
Status: CANCELLED | OUTPATIENT
Start: 2022-12-08

## 2022-12-08 RX ORDER — DIPHENHYDRAMINE HYDROCHLORIDE 50 MG/ML
50 INJECTION INTRAMUSCULAR; INTRAVENOUS PRN
Status: CANCELLED | OUTPATIENT
Start: 2022-12-08

## 2022-12-08 RX ORDER — DIPHENHYDRAMINE HYDROCHLORIDE 50 MG/ML
50 INJECTION INTRAMUSCULAR; INTRAVENOUS PRN
Status: CANCELLED | OUTPATIENT
Start: 2023-05-28

## 2022-12-08 RX ORDER — ONDANSETRON 2 MG/ML
8 INJECTION INTRAMUSCULAR; INTRAVENOUS EVERY 4 HOURS PRN
Status: CANCELLED | OUTPATIENT
Start: 2022-12-08

## 2022-12-08 RX ORDER — ONDANSETRON 4 MG/1
4 TABLET, ORALLY DISINTEGRATING ORAL EVERY 4 HOURS PRN
Status: CANCELLED | OUTPATIENT
Start: 2023-05-28

## 2022-12-08 RX ORDER — 0.9 % SODIUM CHLORIDE 0.9 %
3 VIAL (ML) INJECTION PRN
Status: CANCELLED | OUTPATIENT
Start: 2022-12-08

## 2022-12-08 RX ORDER — EPINEPHRINE 1 MG/ML(1)
0.5 AMPUL (ML) INJECTION PRN
Status: CANCELLED | OUTPATIENT
Start: 2022-12-08

## 2022-12-08 RX ORDER — SODIUM CHLORIDE 9 MG/ML
INJECTION, SOLUTION INTRAVENOUS CONTINUOUS
Status: CANCELLED | OUTPATIENT
Start: 2023-05-28

## 2022-12-08 RX ORDER — 0.9 % SODIUM CHLORIDE 0.9 %
3 VIAL (ML) INJECTION PRN
Status: CANCELLED | OUTPATIENT
Start: 2023-05-28

## 2022-12-08 RX ORDER — METHYLPREDNISOLONE SODIUM SUCCINATE 125 MG/2ML
125 INJECTION, POWDER, LYOPHILIZED, FOR SOLUTION INTRAMUSCULAR; INTRAVENOUS PRN
Status: CANCELLED | OUTPATIENT
Start: 2022-12-08

## 2022-12-08 RX ADMIN — METHYLPREDNISOLONE SODIUM SUCCINATE 100 MG: 125 INJECTION, POWDER, FOR SOLUTION INTRAMUSCULAR; INTRAVENOUS at 10:55

## 2022-12-08 RX ADMIN — OCRELIZUMAB 600 MG: 300 INJECTION INTRAVENOUS at 11:24

## 2022-12-08 RX ADMIN — DIPHENHYDRAMINE HYDROCHLORIDE 50 MG: 25 TABLET ORAL at 10:55

## 2022-12-08 RX ADMIN — ACETAMINOPHEN 650 MG: 325 TABLET ORAL at 10:55

## 2022-12-08 ASSESSMENT — FIBROSIS 4 INDEX: FIB4 SCORE: .935414346693485346

## 2022-12-08 NOTE — PROGRESS NOTES
Pt arrived to [facility] for Ocrevus, ambulatory with forearm crutch. POC discussed and pt verbalized understanding, denies any recent vaccines, states he feels well today. PIV established without difficulty. Premedications given. Ocrevus titrated: 100 ml/hr for 15 minutes, then 200 ml/hr for 15 minutes, then 250 ml/hr for 30 minutes, and then 300 ml/hr for the rest of the infusion. Pt tolerated well. No s/s of reaction or complications noted. PIV flushed, removed with tip intact, and site covered with gauze and coban. Pt discharged ambulatory to self care in no apparent distress. Next appt scheduled/confirmed with pt prior to discharging home.

## 2023-01-25 ENCOUNTER — TELEPHONE (OUTPATIENT)
Dept: NEUROLOGY | Facility: MEDICAL CENTER | Age: 53
End: 2023-01-25
Payer: MEDICARE

## 2023-01-25 NOTE — TELEPHONE ENCOUNTER
NEUROLOGY PATIENT PRE-VISIT PLANNING     Patient was NOT contacted to complete PVP.  Note: Patient will not be contacted if there is no indication to call.     Patient Appointment is scheduled as: Established Patient     Is visit type and length scheduled correctly? Yes    [facility] Patient Demographics? Yes    3.   Is referral attached to visit? Yes    4. Were records received from referring provider? Yes    4. Patient was NOT contacted to have someone accompany them to visit.     5. Is this appointment scheduled as a Hospital Follow-Up?  No    6. Does the patient require any pre procedure or post procedure follow up? Yes    7. If any orders were placed at last visit or intended to be done for this visit do we have Results/Consult Notes? No  Labs - Labs ordered, NOT completed. Patient advised to complete prior to next appointment.  Imaging - Imaging ordered, completed and results are in chart.  Referrals - Referral ordered, patient has NOT been seen.  Note: If patient appointment is for lab or imaging review and patient did not complete the studies, check with provider if OK to reschedule patient until completed.    8. If patient appointment is for Botox - is order pended for provider? N/A

## 2023-01-31 ENCOUNTER — OFFICE VISIT (OUTPATIENT)
Dept: NEUROLOGY | Facility: MEDICAL CENTER | Age: 53
End: 2023-01-31
Attending: PSYCHIATRY & NEUROLOGY
Payer: MEDICARE

## 2023-01-31 VITALS
OXYGEN SATURATION: 97 % | WEIGHT: 166.23 LBS | BODY MASS INDEX: 22.52 KG/M2 | DIASTOLIC BLOOD PRESSURE: 76 MMHG | TEMPERATURE: 97.2 F | SYSTOLIC BLOOD PRESSURE: 114 MMHG | HEART RATE: 80 BPM

## 2023-01-31 DIAGNOSIS — G35 MULTIPLE SCLEROSIS (HCC): ICD-10-CM

## 2023-01-31 DIAGNOSIS — R25.2 SPASTICITY: ICD-10-CM

## 2023-01-31 PROCEDURE — 99215 OFFICE O/P EST HI 40 MIN: CPT | Performed by: PSYCHIATRY & NEUROLOGY

## 2023-01-31 PROCEDURE — 99212 OFFICE O/P EST SF 10 MIN: CPT | Performed by: PSYCHIATRY & NEUROLOGY

## 2023-01-31 RX ORDER — GABAPENTIN 300 MG/1
300 CAPSULE ORAL 3 TIMES DAILY
Qty: 270 CAPSULE | Refills: 3 | Status: SHIPPED | OUTPATIENT
Start: 2023-01-31 | End: 2023-07-18 | Stop reason: SDUPTHER

## 2023-01-31 RX ORDER — BACLOFEN 10 MG/1
TABLET ORAL
Qty: 270 TABLET | Refills: 2 | Status: SHIPPED | OUTPATIENT
Start: 2023-01-31 | End: 2023-07-18 | Stop reason: SDUPTHER

## 2023-01-31 RX ORDER — DALFAMPRIDINE 10 MG/1
1 TABLET, FILM COATED, EXTENDED RELEASE ORAL EVERY 12 HOURS
Qty: 60 TABLET | Refills: 11 | Status: SHIPPED | OUTPATIENT
Start: 2023-01-31 | End: 2023-03-02

## 2023-01-31 ASSESSMENT — PATIENT HEALTH QUESTIONNAIRE - PHQ9: CLINICAL INTERPRETATION OF PHQ2 SCORE: 0

## 2023-01-31 ASSESSMENT — FIBROSIS 4 INDEX: FIB4 SCORE: 0.95

## 2023-01-31 NOTE — PROGRESS NOTES
Chief Complaint   Patient presents with    Follow-Up     MS       Problem List Items Addressed This Visit       Multiple sclerosis (HCC)     Pt has skied last year but [place]. Pt feels like he gets the complete lethargic feeling and numbness in his hands and feet. Pt states he never has any relief. Pt feels like he eating right and exercising and he is on the correct treatment. Pt does not stay up late anymore. Pt states that he has some improvement with the bladder and now he can go longer with his bladder. Pt used to only have one minute to get to the bathroom and now he has about 20. Pt is due in June for the next infusion. Pt states that he gets benefit from the gabapentin and fredy ampyra helps him with his walking speed and endurance.         Relevant Medications    Dalfampridine (AMPYRA) 10 MG TABLET SR 12 HR    gabapentin (NEURONTIN) 300 MG Cap    Other Relevant Orders    CBC WITH DIFFERENTIAL    Comp Metabolic Panel    HEPATITIS PANEL ACUTE(4 COMPONENTS)    IMMUNOGLOBULINS A/G/M SERUM    Quantiferon Gold TB (PPD)     Other Visit Diagnoses       Spasticity        Relevant Medications    baclofen (LIORESAL) 10 MG Tab            History of present illness:  Ross Lindsay 52 y.o. male presents today for for MS and he is doing well. Pt has some brain fog and he tries to play the word games but he is not as quick as he used to be. Pt feels some aging issues also. Pt has not had any recent infections.    Past medical history:   Past Medical History:   Diagnosis Date    Acid reflux     Depression     MS (multiple sclerosis) ([facility])        Past surgical history:   No past surgical history on file.    Family history:   Family History   Problem Relation Age of Onset    Cancer Mother         lung    Diabetes Mother     Heart Disease Mother     Hypertension Mother        Social history:   Social History     Socioeconomic History    Marital status:  Single     Spouse name: Not on file    Number of children: Not on file    Years of education: Not on file    Highest education level: Associate degree: occupational, technical, or vocational program   Occupational History    Not on file   Tobacco Use    Smoking status: Former     Packs/day: 0.50     Years: 1.00     Pack years: 0.50     Types: Cigarettes    Smokeless tobacco: Never   Vaping Use    Vaping Use: Never used   Substance and Sexual Activity    Alcohol use: No     Alcohol/week: 0.0 oz     Comment: occasionally    Drug use: No    Sexual activity: Not on file   Other Topics Concern    Not on file   Social History Narrative    Not on file     Social Determinants of Health     Financial Resource Strain: Medium Risk    Difficulty of Paying Living Expenses: Somewhat hard   Food Insecurity: No Food Insecurity    Worried About Running Out of Food in the Last Year: Never true    Ran Out of Food in the Last Year: Never true   Transportation Needs: No Transportation Needs    Lack of Transportation (Medical): No    Lack of Transportation (Non-Medical): No   Physical Activity: Sufficiently Active    Days of Exercise per Week: 4 days    Minutes of Exercise per Session: 60 min   Stress: Stress Concern Present    Feeling of Stress : Very much   Social Connections: Unknown    Frequency of Communication with Friends and Family: More than three times a week    Frequency of Social Gatherings with Friends and Family: Patient refused    Attends [employer]ze Services: Patient refused    Active Member of Clubs or Organizations: Patient refused    Attends Club or Organization Meetings: Patient refused    Marital Status:    Intimate Partner Violence: Not on file   Housing Stability: Low Risk     Unable to Pay for Housing in the Last Year: No    Number of Places Lived in the Last Year: 1    Unstable Housing in the Last Year: No       Current medications:   Current Outpatient Medications   Medication    Dalfampridine (AMPYRA) 10  MG TABLET SR 12 HR    baclofen (LIORESAL) 10 MG Tab    gabapentin (NEURONTIN) 300 MG Cap    lisinopril (PRINIVIL) 10 MG Tab    Cholecalciferol (VITAMIN D3) 3000 units Tab    Cyanocobalamin (VITAMIN B-12) 1000 MCG Tab     No current facility-administered medications for this visit.       Medication Allergy:  No Known Allergies    Review of systems:   Constitutional: denies fever, night sweats, weight loss.   Eyes: denies acute vision change, eye pain or secretion.   Ears, Nose, Mouth, Throat: denies nasal secretion, nasal bleeding, difficulty swallowing, hearing loss, tinnitus, vertigo, ear pain, acute dental problems, oral ulcers or lesions.   Endocrine: denies recent weight changes, heat or cold intolerance, polyuria, polydypsia, polyphagia,abnormal hair growth.  Cardiovascular: denies new onset of chest pain, palpitations, syncope, or dyspnea of exertion.  Pulmonary: denies shortness of breath, new onset of cough, hemoptysis, wheezing, chest pain or flu-like symptoms.   GI: denies nausea, vomiting, diarrhea, GI bleeding, change in appetite, abdominal pain, and change in bowel habits.  : denies dysuria, urinary incontinence, hematuria.  Heme/oncology: denies history of easy bruising or bleeding. No history of cancer, DVTor PE.  Allergy/immunology: denies hives/urticaria, or itching.   Dermatologic: denies new rash, or new skin lesions.  Musculoskeletal:denies joint swelling or pain, muscle pain, neck and back pain. Neurologic: denies headaches, acute visual changes, facial droopiness, muscle weakness (focal or generalized), paresthesias, anesthesia, ataxia, change in speech or language, memory loss, abnormal movements, seizures, loss of consciousness, or episodes of confusion.   Psychiatric: denies symptoms of depression, anxiety, hallucinations, mood swings or changes, suicidal or homicidal thoughts.     Physical examination:   Vitals:    01/31/23 1013   BP: 114/76   BP Location: Left arm   Patient Position:  Sitting   BP Cuff Size: Adult   Pulse: 80   Temp: 36.2 °C (97.2 °F)   TempSrc: Temporal   SpO2: 97%   Weight: 75.4 kg (166 lb 3.6 oz)     General: Patient in no acute distress, pleasant and cooperative.  HEENT: Normocephalic, no signs of acute trauma.   Neck: supple, no meningeal signs or carotid bruits. There is normal range of motion. No tenderness on exam.   Chest: clear to auscultation. No cough.   CV: RRR, no murmurs.   Skin: no signs of acute rashes or trauma.   Musculoskeletal: joints exhibit full range of motion, without any pain to palpation. There are no signs of joint or muscle swelling. There is no tenderness to deep palpation of muscles.   Psychiatric: No hallucinatory behavior. Denies symptoms of depression or suicidal ideation. Mood and affect appear normal on exam.     NEUROLOGICAL EXAM:   Mental status, orientation: Awake, alert and fully oriented.   Speech and language: speech is clear and fluent. The patient is able to name, repeat and comprehend.   Memory: There is intact recollection of recent and remote events.   Cranial nerve exam: Pupils are 3-4 mm bilaterally and equally reactive to light and accommodation. Visual fields are intact by confrontation. Fundoscopic exam was unremarkable. There is no nystagmus on primary or secondary gaze. Intact full EOM in all directions of gaze. Face appears symmetric. Sensation in the face is intact to light touch. Uvula is midline. Palate elevates symmetrically. Tongue is midline and without any signs of tongue biting or fasciculations. Sternocleidomastoid muscles exhibit is normal strength bilaterally. Shoulder shrug is intact bilaterally.   Motor exam: Strength is 5/5 in all extremities. Tone is normal. No abnormal movements were seen on exam.   Sensory exam reveals normal sense of light touch, proprioception, vibration and pinprick in all extremities.   Deep tendon reflexes:  2+ throughout. Plantar responses are flexor. There is no clonus.   Coordination:  "shows a normal finger-nose-finger. Normal rapidly alternating movements.   Gait: The patient was able to get up from seated position on first attempt with requiring assistance of a cane. Found to be unsteady when walking. Movements were fluid with normal arm swing. The patient was able to turn without difficulties or tendency to fall. Romberg examination positive      ANCILLARY DATA REVIEWED:     Lab Data Review:  No results found for this or any previous visit (from the past 24 hour(s)).    Records reviewed:    Latest Reference Range & Units 21 12:12   Immunoglobulin A 68 - 408 mg/dL 144   Immunoglobulin G 768 - 1632 mg/dL 685 (L)   Immunoglobulin M 35 - 263 mg/dL 65   (L): Data is abnormally low      Imagin/31/2021 Routine     Narrative & Impression     2021 11:15 AM     HISTORY/REASON FOR EXAM:  Multiple sclerosis, monitor.     TECHNIQUE/EXAM DESCRIPTION:   MRI of the brain without and with contrast.     T1 sagittal, T2 fast spin-echo axial, T1 coronal, FLAIR coronal, diffusion-weighted and apparent diffusion coefficient (ADC map) axial images were obtained of the whole brain. T1 postcontrast axial and T1 postcontrast coronal images were obtained.   Additional FLAIR axial images were obtained.     The study was performed on a [employer] Signa 1.5 Karol MRI scanner.     15 mL ProHance gadolinium contrast was administered intravenously.     COMPARISON:  MRI brain 2019     FINDINGS:  The calvariae are unremarkable.  There are no extra-axial fluid collections.     The ventricular system and basal cisterns are within normal limits.     Again noted are numerous supratentorial white matter lesions primarily involving the periventricular and deep white matter along with a few scattered juxtacortical lesions. There are several \"black hole\" lesions. Lesion morphology is consistent with   multiple sclerosis. No \"active\" enhancing lesions are evident. No definite new lesions or overall progression of " "lesion burden since prior exam.     There are no mass effects or shift of midline structures.  There are no hemorrhagic lesions.  The diffusion weighted axial images show no evidence of acute cerebral infarction.     The postcontrast images show no areas of abnormal parenchymal or meningeal enhancement.     The brainstem and posterior fossa structures again show demyelinating lesions in the right middle cerebellar peduncle, right parasagittal dorsal francis and right cerebellar deep white matter. No definite new lesions or \"active\" enhancing lesions in the   brainstem or cerebellum. Again seen are borderline low-lying cerebellar tonsils.     Again noted is a demyelinating lesion in the right parasagittal upper cervical cord at the C2 level (T2 axial image 1, series 6). No change from prior exam.     Vascular flow voids in the vertebrobasilar and carotid arteries, [place], and dural venous sinuses are intact.     The paranasal sinuses in the field of view are unremarkable.     The mastoids are clear.     IMPRESSION:     1.  Stable appearing supratentorial white matter disease with lesion morphology characteristic of multiple sclerosis. No \"active\" enhancing lesions, definite new lesions, or overall progression of lesion burden compared with prior study from 6/11/2019.  2.  Posterior fossa lesions are also stable with no new or enhancing lesions.  3.  Chronic demyelinating lesion in the upper cervical spinal cord at the C2 level is barely within the field of view. No appreciable change.    ASSESSMENT AND PLAN:    1. Multiple sclerosis (HCC)  Plan to continue the Ocrevus every 6 months and get labs prior to his infusion. 25 FTW was 12.35 seconds. Pt feels like this definitely increases his mobility.  - Dalfampridine (AMPYRA) 10 MG TABLET SR 12 HR; Take 1 Tablet by mouth every 12 hours for 30 days.  Dispense: 60 Tablet; Refill: 11  - gabapentin (NEURONTIN) 300 MG Cap; Take 1 Capsule by mouth 3 times a day.  " Dispense: 270 Capsule; Refill: 3    2. Spasticity  Refill at the [facility]  - baclofen (LIORESAL) 10 MG Tab; Take 1 tablet by mouth three times daily.  Dispense: 270 Tablet; Refill: 2        FOLLOW-UP:   6 months    My total time spent caring for the patient on the day of the encounter was 42 minutes.   This does not include time spent on separately billable procedures/tests.       EDUCATION AND COUNSELING:  -Discussed regular exercise program and prevention of cardiovascular disease, including stroke.   -Discussed healthy lifestyle, including: healthy diet (rich in fruits, vegetables, nuts and healthy oils); proper hydration, and adequate sleep hygiene (allowing 7-8 hrs of overnight sleep).        Melissa Bloch, MD  Clinical  of Neurology [facility] [facility] [employer].   Diplomate in Neurology.   Office: [phone]  Fax: [phone]

## 2023-01-31 NOTE — ASSESSMENT & PLAN NOTE
Pt has skied last year but this year he is working at the [employer] and doing a lot of the walking. Pt feels like he gets the complete lethargic feeling and numbness in his hands and feet. Pt states he never has any relief. Pt feels like he eating right and exercising and he is on the correct treatment. Pt does not stay up late anymore. Pt states that he has some improvement with the bladder and now he can go longer with his bladder. Pt used to only have one minute to get to the bathroom and now he has about 20. Pt is due in June for the next infusion. Pt states that he gets benefit from the gabapentin and fredy ampyra helps him with his walking speed and endurance.

## 2023-06-08 ENCOUNTER — OUTPATIENT INFUSION SERVICES (OUTPATIENT)
Dept: ONCOLOGY | Facility: MEDICAL CENTER | Age: 53
End: 2023-06-08
Attending: REGISTERED NURSE
Payer: MEDICARE

## 2023-06-08 VITALS
BODY MASS INDEX: 22.99 KG/M2 | HEART RATE: 79 BPM | TEMPERATURE: 98.7 F | DIASTOLIC BLOOD PRESSURE: 81 MMHG | RESPIRATION RATE: 18 BRPM | WEIGHT: 169.75 LBS | OXYGEN SATURATION: 97 % | SYSTOLIC BLOOD PRESSURE: 132 MMHG | HEIGHT: 72 IN

## 2023-06-08 DIAGNOSIS — G35 MULTIPLE SCLEROSIS (HCC): ICD-10-CM

## 2023-06-08 PROCEDURE — 700111 HCHG RX REV CODE 636 W/ 250 OVERRIDE (IP)

## 2023-06-08 PROCEDURE — 700105 HCHG RX REV CODE 258: Mod: UD | Performed by: PSYCHIATRY & NEUROLOGY

## 2023-06-08 PROCEDURE — A9270 NON-COVERED ITEM OR SERVICE: HCPCS | Mod: UD | Performed by: PSYCHIATRY & NEUROLOGY

## 2023-06-08 PROCEDURE — 96375 TX/PRO/DX INJ NEW DRUG ADDON: CPT

## 2023-06-08 PROCEDURE — 96415 CHEMO IV INFUSION ADDL HR: CPT

## 2023-06-08 PROCEDURE — 96413 CHEMO IV INFUSION 1 HR: CPT

## 2023-06-08 PROCEDURE — 700111 HCHG RX REV CODE 636 W/ 250 OVERRIDE (IP): Mod: JG,UD | Performed by: PSYCHIATRY & NEUROLOGY

## 2023-06-08 PROCEDURE — 700105 HCHG RX REV CODE 258: Mod: UD

## 2023-06-08 PROCEDURE — 700102 HCHG RX REV CODE 250 W/ 637 OVERRIDE(OP): Mod: UD | Performed by: PSYCHIATRY & NEUROLOGY

## 2023-06-08 RX ORDER — HEPARIN SODIUM (PORCINE) LOCK FLUSH IV SOLN 100 UNIT/ML 100 UNIT/ML
500 SOLUTION INTRAVENOUS PRN
Status: CANCELLED | OUTPATIENT
Start: 2023-12-03

## 2023-06-08 RX ORDER — ONDANSETRON 2 MG/ML
8 INJECTION INTRAMUSCULAR; INTRAVENOUS EVERY 4 HOURS PRN
Status: CANCELLED | OUTPATIENT
Start: 2023-12-03

## 2023-06-08 RX ORDER — DIPHENHYDRAMINE HCL 25 MG
50 TABLET ORAL ONCE
Status: CANCELLED | OUTPATIENT
Start: 2023-12-03 | End: 2023-12-03

## 2023-06-08 RX ORDER — 0.9 % SODIUM CHLORIDE 0.9 %
10 VIAL (ML) INJECTION PRN
Status: CANCELLED | OUTPATIENT
Start: 2023-12-03

## 2023-06-08 RX ORDER — METHYLPREDNISOLONE SODIUM SUCCINATE 125 MG/2ML
100 INJECTION, POWDER, LYOPHILIZED, FOR SOLUTION INTRAMUSCULAR; INTRAVENOUS ONCE
Status: CANCELLED | OUTPATIENT
Start: 2023-12-03

## 2023-06-08 RX ORDER — METHYLPREDNISOLONE SODIUM SUCCINATE 125 MG/2ML
100 INJECTION, POWDER, LYOPHILIZED, FOR SOLUTION INTRAMUSCULAR; INTRAVENOUS ONCE
Status: COMPLETED | OUTPATIENT
Start: 2023-06-08 | End: 2023-06-08

## 2023-06-08 RX ORDER — SODIUM CHLORIDE 9 MG/ML
INJECTION, SOLUTION INTRAVENOUS CONTINUOUS
Status: CANCELLED | OUTPATIENT
Start: 2023-12-03

## 2023-06-08 RX ORDER — DIPHENHYDRAMINE HYDROCHLORIDE 50 MG/ML
50 INJECTION INTRAMUSCULAR; INTRAVENOUS PRN
Status: CANCELLED | OUTPATIENT
Start: 2023-12-03

## 2023-06-08 RX ORDER — 0.9 % SODIUM CHLORIDE 0.9 %
3 VIAL (ML) INJECTION PRN
Status: CANCELLED | OUTPATIENT
Start: 2023-12-03

## 2023-06-08 RX ORDER — ONDANSETRON 4 MG/1
4 TABLET, ORALLY DISINTEGRATING ORAL EVERY 4 HOURS PRN
Status: CANCELLED | OUTPATIENT
Start: 2023-12-03

## 2023-06-08 RX ORDER — METHYLPREDNISOLONE SODIUM SUCCINATE 125 MG/2ML
125 INJECTION, POWDER, LYOPHILIZED, FOR SOLUTION INTRAMUSCULAR; INTRAVENOUS PRN
Status: CANCELLED | OUTPATIENT
Start: 2023-12-03

## 2023-06-08 RX ORDER — 0.9 % SODIUM CHLORIDE 0.9 %
VIAL (ML) INJECTION PRN
Status: CANCELLED | OUTPATIENT
Start: 2023-12-03

## 2023-06-08 RX ORDER — ACETAMINOPHEN 325 MG/1
650 TABLET ORAL ONCE
Status: COMPLETED | OUTPATIENT
Start: 2023-06-08 | End: 2023-06-08

## 2023-06-08 RX ORDER — EPINEPHRINE 1 MG/ML(1)
0.5 AMPUL (ML) INJECTION PRN
Status: CANCELLED | OUTPATIENT
Start: 2023-12-03

## 2023-06-08 RX ORDER — DIPHENHYDRAMINE HCL 25 MG
50 TABLET ORAL ONCE
Status: COMPLETED | OUTPATIENT
Start: 2023-06-08 | End: 2023-06-08

## 2023-06-08 RX ORDER — ACETAMINOPHEN 325 MG/1
650 TABLET ORAL ONCE
Status: CANCELLED | OUTPATIENT
Start: 2023-12-03

## 2023-06-08 RX ADMIN — DIPHENHYDRAMINE HYDROCHLORIDE 50 MG: 25 TABLET ORAL at 10:04

## 2023-06-08 RX ADMIN — ACETAMINOPHEN 650 MG: 325 TABLET ORAL at 10:04

## 2023-06-08 RX ADMIN — OCRELIZUMAB 600 MG: 300 INJECTION INTRAVENOUS at 10:15

## 2023-06-08 RX ADMIN — METHYLPREDNISOLONE SODIUM SUCCINATE 100 MG: 125 INJECTION, POWDER, FOR SOLUTION INTRAMUSCULAR; INTRAVENOUS at 10:05

## 2023-06-08 ASSESSMENT — FIBROSIS 4 INDEX: FIB4 SCORE: 0.95

## 2023-06-08 NOTE — PROGRESS NOTES
Mr Neftali presented [facility] for Ocrevus. POC discussed, including estimated treatment time and hour of observation, pt agreeable. Pt denies any current s/s of infection or open wounds/sores. PIV started to right FA, brisk blood return observed. Pre-meds given as ordered. Ocrevus infused and titrated per pharmacy instructions to max rate of 300 ml/hr. Pt tolerated well with no s/s of adverse reaction. 1 hour obs completed with no s/s of adverse reaction. PIV flushed and removed, gauze and coban dressing placed. Schedulers emailed about future appointments. Pt discharged to home in good condition.

## 2023-06-28 NOTE — PROGRESS NOTES
"[facility] NEUROLOGY  MULTIPLE SCLEROSIS & NEUROIMMUNOLOGY  FOLLOW-UP VISIT    DISEASE SUMMARY:  MS History:  Diagnosed: 2013  Lumbar puncture:  Yes + oliglonal bands  First presentation: Tingling bilateral hands and feet  Disease modifying treatment: tecfidera, tysabri              Current: Ocrevus June 14, 2019              Previous:   Former or other neurologist:   Last attack:   Last MRI:       CC: MS    INTERVAL HISTORY:  Ross Lindsay is a 50 y.o. male with MS.    I last saw him in the  Clinic on 09/01/2020.    At that time I saw him for DMV paperwork.   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 Now on Ocrevus.    Last Ocrevus June.  Heat is bothersome and symptoms gets worse.  Periods where eating is not good and goes to protein shakes to keep something in his body.  Goes through periods of soft foods.      A review of MS-related symptoms was notable for the following:    Fatigue: yes; bothers him from time to time but has a better handle of it.  Weakness: yes; left side generalized weakness  Numbness: yes; fingertips and bottom of feet, sometimes drop foot to right  Incoordination: yes; using assistive forearm crutch   Spasms/Spasticity: yes; both legs and tapping tking baclofen  Vision Impairment: yes; ok  Walking/Balance Problems: yes; assistive device help  Neuralgia: no  Bowel Symptoms: yes; constipation  Bladder Symptoms: yes; self catheterization  Heat Sensitivity: yes; fatigue  Depression: no  Cognitive/Memory Problems: yes; \"senior moments\"  Sexual Dysfunction: N/D  Anxiety: yes; nothing over the top    MEDICATIONS:  Current Outpatient Medications   Medication Sig   • zolpidem (AMBIEN) 5 MG Tab Take 5 mg by mouth at bedtime as needed for Sleep.   • lisinopril (PRINIVIL) 10 MG Tab Take 1 tablet by mouth every day.   • AMPYRA 10 MG TABLET SR 12 HR TAKE 1 TABLET BY MOUTH  EVERY 12 HOURS   • baclofen " Dr. Torres made aware of 103.1 temp. No new orders since blood cultures were drawn this morning.    (LIORESAL) 10 MG Tab TAKE 1 TABLET BY MOUTH THREE TIMES A DAY   • gabapentin (NEURONTIN) 300 MG Cap Take 1 capsule by mouth 3 times daily.   • Cholecalciferol (VITAMIN D3) 3000 units Tab Take  by mouth.   • Cyanocobalamin (VITAMIN B-12) 1000 MCG Tab TAKE ONE TABLET BY MOUTH DAILY     MEDICAL, SOCIAL, AND FAMILY HISTORY:  There is no change in the patient's ROS or medical, social, or family histories since the previous visit.    REVIEW OF SYSTEMS:  A ROS was completed.  Pertinent positives and negatives were included in the HPI, above.  All other systems were reviewed and are negative.    PHYSICAL EXAM:  General/Medical:  - NAD  - hair, skin, nails, and joints were normal  - neck was supple without Lhermitte's phenomenon  - heart rate and rhythm were regular, no carotid bruits appreciated    Neuro:  MENTAL STATUS: awake and alert; no deficits of speech or language; oriented to person, place, and time; affect was appropriate to situation    CRANIAL NERVES:    II  pupils: 3/3 to 2/2 without a relative afferent pupillary defect, discs: sharp, no red desaturation noted    III/IV/VI: versions: intact without nystagmus    V: facial sensation: symmetric to light touch    VII: facial expression: symmetric    VIII: hearing: intact to finger rub    IX/X: palate: elevates symmetrically    XI: shoulder shrug: symmetric    XII: tongue: midline    MOTOR:  - bulk: normal throughout  - tone: normal throughout  Upper Extremity Strength  (R/L)    5/5   Elbow flexion 5/5   Elbow extension 5/5   Shoulder abduction 5/5     Lower Extremity Strength  (R/L)   Hip flexion 5/   Knee extension 5/5   Knee flexion 5/5   Ankle plantarflexion 5/5   Ankle dorsiflexion 5/5     - cannot walk on toes and heels  - pronator drift: absent  - abnormal movements: none    SENSATION:  - light touch: equal to bilateral sides of body  - pinprick: N/T  - proprioception: N/T  - Romberg: absent    COORDINATION:  - finger to nose: normal, no ataxia on exam  -  "finger tapping: rapid and accurate, bilaterally    REFLEXES:  Reflex Right Left   BR 2+ 2+   Biceps 2+ 2+   Triceps 2+ 2+   Patellae 2+ 2+   Achilles 2+ 2+   Toes down down     GAIT:  - narrow base and normal  - heel-raised/toe-raised gait: intact/intact  - tandem gait: intact    QUANTITATIVE SCORES:  Timed 25-foot walk (sec): 13.55  Assistive device: none    REVIEW OF IMAGING STUDIES: I reviewed the following studies:  MRI Brain:  Date: 06/11/2019  Impression:  IMPRESSION:     1.  Stable appearing moderate supratentorial white matter disease with lesion morphology consistent with multiple sclerosis. No \"active\" enhancing lesions, no definite new lesions, and no overall progression of lesion burden is evident on today's exam.  2.  Demyelinating lesions again noted in the brainstem and right middle cerebellar peduncle. No significant change.  3.  Demyelinating lesion in the upper cervical spinal cord at the C2 level. This was seen on MRI cervical spine 11/10/2017.           MRI Cervical Spine:  Date: 06/11/2019  Impression:  IMPRESSION:     1.  Extensive chronic appearing demyelinating lesions at multiple levels in the cervical spinal cord as detailed above. No definite new lesions or \"active\" enhancing lesions on today's exam.  2.  No significant disc bulge or protrusion, central stenosis, or foraminal stenosis at any cervical level.    MRI Thoracic Spine:  Date: 06/11/2019  Impression:  IMPRESSION:     1.  Extensive longitudinally oriented demyelinating lesions involving virtually every thoracic level with substantial progression of lesion burden since 1/20/2015. No \"active\" enhancing lesions are evident on today's exam.  2.  T7-T8 minimal ventral extradural defect without compromise of the thecal sac or cord.         Last Resulted: 06/11/19  5:23 PM            REVIEW OF LABORATORY STUDIES:  Results for CATRINACINDYCHRISSREECE (MRN [id number]) as of 7/28/2021 08:40   Ref. Range 12/2/2020 10:20   WBC Latest Ref Range: " 4.8 - 10.8 K/uL 7.4   RBC Latest Ref Range: 4.70 - 6.10 M/uL 5.68   Hemoglobin Latest Ref Range: 14.0 - 18.0 g/dL 16.3   Hematocrit Latest Ref Range: 42.0 - 52.0 % 50.3   MCV Latest Ref Range: 81.4 - 97.8 fL 88.6   MCH Latest Ref Range: 27.0 - 33.0 pg 28.7   MCHC Latest Ref Range: 33.7 - 35.3 g/dL 32.4 (L)   RDW Latest Ref Range: 35.9 - 50.0 fL 42.4   Platelet Count Latest Ref Range: 164 - 446 K/uL 217   MPV Latest Ref Range: 9.0 - 12.9 fL 10.6   Neutrophils-Polys Latest Ref Range: 44.00 - 72.00 % 77.90 (H)   Neutrophils (Absolute) Latest Ref Range: 1.82 - 7.42 K/uL 5.79   Lymphocytes Latest Ref Range: 22.00 - 41.00 % 12.50 (L)   Lymphs (Absolute) Latest Ref Range: 1.00 - 4.80 K/uL 0.93 (L)   Monocytes Latest Ref Range: 0.00 - 13.40 % 7.70   Monos (Absolute) Latest Ref Range: 0.00 - 0.85 K/uL 0.57   Eosinophils Latest Ref Range: 0.00 - 6.90 % 0.70   Eos (Absolute) Latest Ref Range: 0.00 - 0.51 K/uL 0.05   Basophils Latest Ref Range: 0.00 - 1.80 % 0.80   Baso (Absolute) Latest Ref Range: 0.00 - 0.12 K/uL 0.06   Immature Granulocytes Latest Ref Range: 0.00 - 0.90 % 0.40   Immature Granulocytes (abs) Latest Ref Range: 0.00 - 0.11 K/uL 0.03   Nucleated RBC Latest Units: /100 WBC 0.00   NRBC (Absolute) Latest Units: K/uL 0.00     Results for LAWRENCECHRISSREECE (MRN [id number]) as of 7/28/2021 08:40   Ref. Range 12/2/2020 10:20   Sodium Latest Ref Range: 135 - 145 mmol/L 137   Potassium Latest Ref Range: 3.6 - 5.5 mmol/L 4.3   Chloride Latest Ref Range: 96 - 112 mmol/L 101   Co2 Latest Ref Range: 20 - 33 mmol/L 26   Anion Gap Latest Ref Range: 7.0 - 16.0  10.0   Glucose Latest Ref Range: 65 - 99 mg/dL 91   Bun Latest Ref Range: 8 - 22 mg/dL 14   Creatinine Latest Ref Range: 0.50 - 1.40 mg/dL 0.71   GFR If  Latest Ref Range: >60 mL/min/1.73 m 2 >60   GFR If Non  Latest Ref Range: >60 mL/min/1.73 m 2 >60   Calcium Latest Ref Range: 8.5 - 10.5 mg/dL 9.8   AST(SGOT) Latest Ref Range: 12 - 45  U/L 10 (L)   ALT(SGPT) Latest Ref Range: 2 - 50 U/L 12   Alkaline Phosphatase Latest Ref Range: 30 - 99 U/L 80   Total Bilirubin Latest Ref Range: 0.1 - 1.5 mg/dL 0.8   Albumin Latest Ref Range: 3.2 - 4.9 g/dL 4.7   Total Protein Latest Ref Range: 6.0 - 8.2 g/dL 7.1   Globulin Latest Ref Range: 1.9 - 3.5 g/dL 2.4   A-G Ratio Latest Units: g/dL 2.0     ASSESSMENT:  Ross Lindsay is a 50 y.o. male with a history of MS.  No new complaints. Biggest complaint today is fatigue.  Keeping active and walking quite a bit for exercise. Imaging in 2019 did not reveal any new lesions.  He is due for DMV paperwork.  Will schedule.      PLAN:  DMV paperwork tomorrow.  F/U Bloch 6 months.    1. Multiple sclerosis (HCC)    - MR-BRAIN-WITH & W/O; Future  - MR-CERVICAL SPINE-WITH & W/O; Future  - MR-THORACIC SPINE-WITH & W/O; Future  - CBC WITH DIFFERENTIAL; Future  - Comp Metabolic Panel; Future  - IMMUNOGLOBULINS A/G/M SERUM; Future  - LYMPHOCYTE SUBSET PANEL 5-TOTAL LYMPHOCYTE; Future    EDUCATION AND COUNSELIN minutes was spent of which greater than 5% was invloved with education and counseling.  The patient/family educated on diagnosis and prognosis. They understand MS is a chronic progressive disorder for which there is currently no cure. Despite best efforts in management, the condition is likely to progress and carries significant risk for disability including physical and cognitive.   -Effectiveness, indications, and safety profile of available Disease Modifying Agents (DMA’s) reviewed.   -Side effects of DMA’s were discussed, including: flu like symptoms, myalgias, injection site (infection, pain, bleeding), abnormal LFT’s, pancreatitis, weight changes, development of autoantibodies which may decrease effective of the medication, panic/anxiety attacks, abnormal blood counts (increase risk for bleeding, infections, cancer), increased risk for fetal complications (including congenital malformations,  developmental/intellectual disability).    -The patient will require frequent follow up and monitoring.   -Laboratory monitoring every 3 months: CBC, CMP, thyroid panel, vitamin D, UA.   -Imaging of entire neuro-axis (brain and spinal cord) with MRI’s w/wo contrast, every six months.   -Patient/family counseled on medication compliance.       Follow-Up:  - Six months with Dr. Bloch    Signed: JULIO Castro

## 2023-07-13 ENCOUNTER — TELEPHONE (OUTPATIENT)
Dept: NEUROLOGY | Facility: MEDICAL CENTER | Age: 53
End: 2023-07-13
Payer: MEDICARE

## 2023-07-13 NOTE — TELEPHONE ENCOUNTER
NEUROLOGY PATIENT PRE-VISIT PLANNING     Patient was NOT contacted to complete PVP.  Note: Patient will not be contacted if there is no indication to call.     Patient Appointment is scheduled as: Established Patient     Is visit type and length scheduled correctly? Yes    [employer] Patient is checked in Patient Demographics? Yes    3.   Is referral attached to visit? Yes    4. Were records received from referring provider? Yes    4. Patient was NOT contacted to have someone accompany them to visit.     5. Is this appointment scheduled as a Hospital Follow-Up?  No    6. Does the patient require any pre procedure or post procedure follow up? No    7. If any orders were placed at last visit or intended to be done for this visit do we have Results/Consult Notes? No  Labs - Labs were ordered, pt contacted to get them done.  Imaging - Imaging was not ordered at last office visit.  Referrals - No referrals were ordered at last office visit.  Note: If patient appointment is for lab or imaging review and patient did not complete the studies, check with provider if OK to reschedule patient until completed.    8. If patient appointment is for Botox - is order pended for provider? N/A

## 2023-07-18 ENCOUNTER — HOSPITAL ENCOUNTER (OUTPATIENT)
Dept: LAB | Facility: MEDICAL CENTER | Age: 53
End: 2023-07-18
Attending: PSYCHIATRY & NEUROLOGY
Payer: MEDICARE

## 2023-07-18 ENCOUNTER — OFFICE VISIT (OUTPATIENT)
Dept: NEUROLOGY | Facility: MEDICAL CENTER | Age: 53
End: 2023-07-18
Attending: PSYCHIATRY & NEUROLOGY
Payer: MEDICARE

## 2023-07-18 VITALS
HEART RATE: 65 BPM | SYSTOLIC BLOOD PRESSURE: 118 MMHG | BODY MASS INDEX: 23.29 KG/M2 | DIASTOLIC BLOOD PRESSURE: 74 MMHG | HEIGHT: 72 IN | WEIGHT: 171.96 LBS | TEMPERATURE: 97.2 F | OXYGEN SATURATION: 96 %

## 2023-07-18 DIAGNOSIS — G35 MULTIPLE SCLEROSIS (HCC): ICD-10-CM

## 2023-07-18 DIAGNOSIS — R42 VERTIGO: ICD-10-CM

## 2023-07-18 DIAGNOSIS — G43.009 MIGRAINE WITHOUT AURA AND WITHOUT STATUS MIGRAINOSUS, NOT INTRACTABLE: ICD-10-CM

## 2023-07-18 DIAGNOSIS — R25.2 SPASTICITY: ICD-10-CM

## 2023-07-18 LAB
ALBUMIN SERPL BCP-MCNC: 4.9 G/DL (ref 3.2–4.9)
ALBUMIN/GLOB SERPL: 2.2 G/DL
ALP SERPL-CCNC: 77 U/L (ref 30–99)
ALT SERPL-CCNC: 14 U/L (ref 2–50)
ANION GAP SERPL CALC-SCNC: 13 MMOL/L (ref 7–16)
AST SERPL-CCNC: 15 U/L (ref 12–45)
BASOPHILS # BLD AUTO: 0.9 % (ref 0–1.8)
BASOPHILS # BLD: 0.06 K/UL (ref 0–0.12)
BILIRUB SERPL-MCNC: 1.4 MG/DL (ref 0.1–1.5)
BUN SERPL-MCNC: 13 MG/DL (ref 8–22)
CALCIUM ALBUM COR SERPL-MCNC: 9.3 MG/DL (ref 8.5–10.5)
CALCIUM SERPL-MCNC: 10 MG/DL (ref 8.5–10.5)
CHLORIDE SERPL-SCNC: 101 MMOL/L (ref 96–112)
CO2 SERPL-SCNC: 22 MMOL/L (ref 20–33)
CREAT SERPL-MCNC: 0.76 MG/DL (ref 0.5–1.4)
EOSINOPHIL # BLD AUTO: 0.03 K/UL (ref 0–0.51)
EOSINOPHIL NFR BLD: 0.5 % (ref 0–6.9)
ERYTHROCYTE [DISTWIDTH] IN BLOOD BY AUTOMATED COUNT: 42.1 FL (ref 35.9–50)
GFR SERPLBLD CREATININE-BSD FMLA CKD-EPI: 106 ML/MIN/1.73 M 2
GLOBULIN SER CALC-MCNC: 2.2 G/DL (ref 1.9–3.5)
GLUCOSE SERPL-MCNC: 90 MG/DL (ref 65–99)
HCT VFR BLD AUTO: 50.7 % (ref 42–52)
HGB BLD-MCNC: 17.2 G/DL (ref 14–18)
IMM GRANULOCYTES # BLD AUTO: 0.02 K/UL (ref 0–0.11)
IMM GRANULOCYTES NFR BLD AUTO: 0.3 % (ref 0–0.9)
LYMPHOCYTES # BLD AUTO: 1.4 K/UL (ref 1–4.8)
LYMPHOCYTES NFR BLD: 21.6 % (ref 22–41)
MCH RBC QN AUTO: 30.2 PG (ref 27–33)
MCHC RBC AUTO-ENTMCNC: 33.9 G/DL (ref 32.3–36.5)
MCV RBC AUTO: 88.9 FL (ref 81.4–97.8)
MONOCYTES # BLD AUTO: 0.55 K/UL (ref 0–0.85)
MONOCYTES NFR BLD AUTO: 8.5 % (ref 0–13.4)
NEUTROPHILS # BLD AUTO: 4.43 K/UL (ref 1.82–7.42)
NEUTROPHILS NFR BLD: 68.2 % (ref 44–72)
NRBC # BLD AUTO: 0 K/UL
NRBC BLD-RTO: 0 /100 WBC (ref 0–0.2)
PLATELET # BLD AUTO: 250 K/UL (ref 164–446)
PMV BLD AUTO: 10.6 FL (ref 9–12.9)
POTASSIUM SERPL-SCNC: 4.3 MMOL/L (ref 3.6–5.5)
PROT SERPL-MCNC: 7.1 G/DL (ref 6–8.2)
RBC # BLD AUTO: 5.7 M/UL (ref 4.7–6.1)
SODIUM SERPL-SCNC: 136 MMOL/L (ref 135–145)
WBC # BLD AUTO: 6.5 K/UL (ref 4.8–10.8)

## 2023-07-18 PROCEDURE — 99211 OFF/OP EST MAY X REQ PHY/QHP: CPT | Performed by: PSYCHIATRY & NEUROLOGY

## 2023-07-18 PROCEDURE — 82784 ASSAY IGA/IGD/IGG/IGM EACH: CPT

## 2023-07-18 PROCEDURE — 85025 COMPLETE CBC W/AUTO DIFF WBC: CPT

## 2023-07-18 PROCEDURE — 99215 OFFICE O/P EST HI 40 MIN: CPT | Performed by: PSYCHIATRY & NEUROLOGY

## 2023-07-18 PROCEDURE — 80053 COMPREHEN METABOLIC PANEL: CPT

## 2023-07-18 PROCEDURE — 3078F DIAST BP <80 MM HG: CPT | Performed by: PSYCHIATRY & NEUROLOGY

## 2023-07-18 PROCEDURE — 3074F SYST BP LT 130 MM HG: CPT | Performed by: PSYCHIATRY & NEUROLOGY

## 2023-07-18 PROCEDURE — 86480 TB TEST CELL IMMUN MEASURE: CPT

## 2023-07-18 PROCEDURE — 36415 COLL VENOUS BLD VENIPUNCTURE: CPT

## 2023-07-18 RX ORDER — BACLOFEN 20 MG/1
20 TABLET ORAL 3 TIMES DAILY
Qty: 90 TABLET | Refills: 11 | Status: SHIPPED | OUTPATIENT
Start: 2023-07-18 | End: 2024-07-12

## 2023-07-18 RX ORDER — GABAPENTIN 300 MG/1
300 CAPSULE ORAL 3 TIMES DAILY
Qty: 270 CAPSULE | Refills: 3 | Status: SHIPPED | OUTPATIENT
Start: 2023-07-18 | End: 2024-01-17

## 2023-07-18 RX ORDER — BACLOFEN 10 MG/1
TABLET ORAL
Qty: 270 TABLET | Refills: 2 | Status: SHIPPED | OUTPATIENT
Start: 2023-07-18 | End: 2023-07-18 | Stop reason: CLARIF

## 2023-07-18 RX ORDER — MECLIZINE HYDROCHLORIDE 25 MG/1
25 TABLET ORAL 3 TIMES DAILY PRN
Qty: 30 TABLET | Refills: 0 | Status: SHIPPED | OUTPATIENT
Start: 2023-07-18 | End: 2023-08-17

## 2023-07-18 RX ORDER — DALFAMPRIDINE 10 MG/1
TABLET, FILM COATED, EXTENDED RELEASE ORAL
COMMUNITY
Start: 2023-06-07 | End: 2024-01-19

## 2023-07-18 ASSESSMENT — FIBROSIS 4 INDEX: FIB4 SCORE: 0.95

## 2023-07-18 NOTE — PROGRESS NOTES
Chief Complaint   Patient presents with    Follow-Up      MS       Problem List Items Addressed This Visit       Multiple sclerosis (HCC)     Pt did well with his Ocrevus in 6/23. He has heat intolerance. Pt has muscle atrophy in his legs and calve muscles.  Pt has a positive mindset. Pt listens to podcasts. Pt has a 25 ftw test of 7.34seconds. Pt gets a little more motion sick that he used to get. Pt had a tooth infection after he cracked a tooth and he got an abscess. Pt took amoxicillin and he got better. Pt is working and keeping in stretching.         Relevant Medications    AMPYRA 10 MG TABLET SR 12 HR    gabapentin (NEURONTIN) 300 MG Cap    baclofen (LIORESAL) 20 MG tablet     Other Visit Diagnoses       Spasticity        Relevant Medications    baclofen (LIORESAL) 20 MG tablet    Vertigo        Relevant Medications    meclizine (ANTIVERT) 25 MG Tab            History of present illness:  Ross Lindsay 52 y.o. male presents today for treatment of Multiple Sclerosis.    Past medical history:   Past Medical History:   Diagnosis Date    Acid reflux     Depression     MS (multiple sclerosis) ([facility])        Past surgical history:   History reviewed. No pertinent surgical history.    Family history:   Family History   Problem Relation Age of Onset    Cancer Mother         lung    Diabetes Mother     Heart Disease Mother     Hypertension Mother        Social history:   Social History     Socioeconomic History    Marital status: Single     Spouse name: Not on file    Number of children: Not on file    Years of education: Not on file    Highest education level: Associate degree: occupational, technical, or vocational program   Occupational History    Not on file   Tobacco Use    Smoking status: Former     Packs/day: 0.50     Years: 1.00     Pack years: 0.50     Types: Cigarettes    Smokeless tobacco: Never   Vaping Use    Vaping Use: Never used   Substance and Sexual Activity    Alcohol use: No      Alcohol/week: 0.0 oz     Comment: occasionally    Drug use: No    Sexual activity: Not on file   Other Topics Concern    Not on file   Social History Narrative    Not on file     Social Determinants of Health     Financial Resource Strain: Medium Risk (10/10/2022)    Overall Financial Resource Strain (CARDIA)     Difficulty of Paying Living Expenses: Somewhat hard   Food Insecurity: No Food Insecurity (10/10/2022)    Hunger Vital Sign     Worried About Running Out of Food in the Last Year: Never true     Ran Out of Food in the Last Year: Never true   Transportation Needs: No Transportation Needs (10/10/2022)    PRAPARE - Transportation     Lack of Transportation (Medical): No     Lack of Transportation (Non-Medical): No   Physical Activity: Sufficiently Active (10/10/2022)    Exercise Vital Sign     Days of Exercise per Week: 4 days     Minutes of Exercise per Session: 60 min   Stress: Stress Concern Present (10/10/2022)    [employer] - Occupational Stress Questionnaire     Feeling of Stress : Very much   Social Connections: Unknown (10/10/2022)    Social Connection and Isolation Panel [NHANES]     Frequency of Communication with Friends and Family: More than three times a week     Frequency of Social Gatherings with Friends and Family: Patient refused     Attends [employer] Services: Patient refused     Active Member of Clubs or Organizations: Patient refused     Attends Club or Organization Meetings: Patient refused     Marital Status:    Intimate Partner Violence: Not on file   Housing Stability: Low Risk  (10/10/2022)    Housing Stability Vital Sign     Unable to Pay for Housing in the Last Year: No     Number of Places Lived in the Last Year: 1     Unstable Housing in the Last Year: No       Current medications:   Current Outpatient Medications   Medication    AMPYRA 10 MG TABLET SR 12 HR    gabapentin (NEURONTIN) 300 MG Cap    baclofen (LIORESAL) 20 MG tablet    meclizine  (ANTIVERT) 25 MG Tab    lisinopril (PRINIVIL) 10 MG Tab    Cholecalciferol (VITAMIN D3) 3000 units Tab    Cyanocobalamin (VITAMIN B-12) 1000 MCG Tab     No current facility-administered medications for this visit.       Medication Allergy:  No Known Allergies    Review of systems:   Constitutional: denies fever, night sweats, weight loss.   Eyes: denies acute vision change, eye pain or secretion.   Ears, Nose, Mouth, Throat: denies nasal secretion, nasal bleeding, difficulty swallowing, hearing loss, tinnitus, vertigo, ear pain, acute dental problems, oral ulcers or lesions.   Endocrine: denies recent weight changes, heat or cold intolerance, polyuria, polydypsia, polyphagia,abnormal hair growth.  Cardiovascular: denies new onset of chest pain, palpitations, syncope, or dyspnea of exertion.  Pulmonary: denies shortness of breath, new onset of cough, hemoptysis, wheezing, chest pain or flu-like symptoms.   GI: denies nausea, vomiting, diarrhea, GI bleeding, change in appetite, abdominal pain, and change in bowel habits.  : denies dysuria, urinary incontinence, hematuria.  Heme/oncology: denies history of easy bruising or bleeding. No history of cancer, DVTor PE.  Allergy/immunology: denies hives/urticaria, or itching.   Dermatologic: denies new rash, or new skin lesions.  Musculoskeletal:denies joint swelling or pain, muscle pain, neck and back pain. Neurologic: denies headaches, acute visual changes, facial droopiness, muscle weakness (focal or generalized), paresthesias, anesthesia, ataxia, change in speech or language, memory loss, abnormal movements, seizures, loss of consciousness, or episodes of confusion.   Psychiatric: denies symptoms of depression, anxiety, hallucinations, mood swings or changes, suicidal or homicidal thoughts.     Physical examination:   Vitals:    07/18/23 1241   BP: 118/74   BP Location: Right arm   Patient Position: Sitting   BP Cuff Size: Adult   Pulse: 65   Temp: 36.2 °C (97.2 °F)    TempSrc: Temporal   SpO2: 96%   Weight: 78 kg (171 lb 15.3 oz)   Height: 1.829 m (6')     General: Patient in no acute distress, pleasant and cooperative.  HEENT: Normocephalic, no signs of acute trauma.   Neck: supple, no meningeal signs or carotid bruits. There is normal range of motion. No tenderness on exam.   Chest: clear to auscultation. No cough.   CV: RRR, no murmurs.   Skin: no signs of acute rashes or trauma.   Musculoskeletal: joints exhibit full range of motion, without any pain to palpation. There are no signs of joint or muscle swelling. There is no tenderness to deep palpation of muscles.   Psychiatric: No hallucinatory behavior. Denies symptoms of depression or suicidal ideation. Mood and affect appear normal on exam.     NEUROLOGICAL EXAM:   Mental status, orientation: Awake, alert and fully oriented.   Speech and language: speech is clear and fluent. The patient is able to name, repeat and comprehend.   Memory: There is intact recollection of recent and remote events.   Cranial nerve exam: Pupils are 3-4 mm bilaterally and equally reactive to light and accommodation. Visual fields are intact by confrontation. Fundoscopic exam was unremarkable. There is no nystagmus on primary or secondary gaze. Intact full EOM in all directions of gaze. Face appears symmetric. Sensation in the face is intact to light touch. Uvula is midline. Palate elevates symmetrically. Tongue is midline and without any signs of tongue biting or fasciculations. Sternocleidomastoid muscles exhibit is normal strength bilaterally. Shoulder shrug is intact bilaterally.   Motor exam: Strength is 4/5 in all extremities. Tone is abnormal. Mild tremor.   Sensory exam reveals normal sense of light touch, proprioception, vibration and pinprick in all extremities.   Deep tendon reflexes:  4+ throughout. Plantar responses are flexor. There is no clonus.   Coordination: shows a normal finger-nose-finger. Normal rapidly alternating  "movements.   Gait: The patient was able to get up from seated position on first attempt with requiring assistance. Found to be unsteady when walking. Movements were fluid with normal arm swing. The patient was able to turn without difficulties or tendency to fall. Romberg examination positive      ANCILLARY DATA REVIEWED:     Lab Data Review:  No results found for this or any previous visit (from the past 24 hour(s)).    Records reviewed: labs from previous reviewed      Imaging:   Reading Physician Reading Date Result Priority   Riki Ellis M.D.  [phone] 8/31/2021 Routine     Narrative & Impression     8/31/2021 11:15 AM     HISTORY/REASON FOR EXAM:  Multiple sclerosis, monitor.     TECHNIQUE/EXAM DESCRIPTION:   MRI of the brain without and with contrast.     T1 sagittal, T2 fast spin-echo axial, T1 coronal, FLAIR coronal, diffusion-weighted and apparent diffusion coefficient (ADC map) axial images were obtained of the whole brain. T1 postcontrast axial and T1 postcontrast coronal images were obtained.   Additional FLAIR axial images were obtained.     The study was performed on a [employer] Signa 1.5 Karol MRI scanner.     15 mL ProHance gadolinium contrast was administered intravenously.     COMPARISON:  MRI brain 6/11/2019     FINDINGS:  The calvariae are unremarkable.  There are no extra-axial fluid collections.     The ventricular system and basal cisterns are within normal limits.     Again noted are numerous supratentorial white matter lesions primarily involving the periventricular and deep white matter along with a few scattered juxtacortical lesions. There are several \"black hole\" lesions. Lesion morphology is consistent with   multiple sclerosis. No \"active\" enhancing lesions are evident. No definite new lesions or overall progression of lesion burden since prior exam.     There are no mass effects or shift of midline structures.  There are no hemorrhagic lesions.  The diffusion weighted axial images " "show no evidence of acute cerebral infarction.     The postcontrast images show no areas of abnormal parenchymal or meningeal enhancement.     The brainstem and posterior fossa structures again show demyelinating lesions in the right middle cerebellar peduncle, right parasagittal dorsal francis and right cerebellar deep white matter. No definite new lesions or \"active\" enhancing lesions in the   brainstem or cerebellum. Again seen are borderline low-lying cerebellar tonsils.     Again noted is a demyelinating lesion in the right parasagittal upper cervical cord at the C2 level (T2 axial image 1, series 6). No change from prior exam.     Vascular flow voids in the vertebrobasilar and carotid arteries, [place], and dural venous sinuses are intact.     The paranasal sinuses in the field of view are unremarkable.     The mastoids are clear.     IMPRESSION:     1.  Stable appearing supratentorial white matter disease with lesion morphology characteristic of multiple sclerosis. No \"active\" enhancing lesions, definite new lesions, or overall progression of lesion burden compared with prior study from 6/11/2019.  2.  Posterior fossa lesions are also stable with no new or enhancing lesions.  3.  Chronic demyelinating lesion in the upper cervical spinal cord at the C2 level is barely within the field of view. No appreciable change.          ASSESSMENT AND PLAN:    1. Multiple sclerosis (HCC)  Pt states that he has more headaches with the heat and he has to keep quiet. He takes 500 mg of tylenol. Pt feels like he is doing well with the Ocrevus every 6 months.  - gabapentin (NEURONTIN) 300 MG Cap; Take 1 Capsule by mouth 3 times a day.  Dispense: 270 Capsule; Refill: 3  - baclofen (LIORESAL) 20 MG tablet; Take 1 Tablet by mouth 3 times a day for 360 days.  Dispense: 90 Tablet; Refill: 11    2. Spasticity  Pt has had increased spasticity  - baclofen (LIORESAL) 20 MG tablet; Take 1 Tablet by mouth 3 times a day for 360 " days.  Dispense: 90 Tablet; Refill: 11    3. Vertigo  This occurs more often with motion.  - meclizine (ANTIVERT) 25 MG Tab; Take 1 Tablet by mouth 3 times a day as needed for Vertigo for up to 30 days.  Dispense: 30 Tablet; Refill: 0        FOLLOW-UP:   6 months      My total time spent caring for the patient on the day of the encounter was 45 minutes.   This does not include time spent on separately billable procedures/tests.     EDUCATION AND COUNSELING:  -Discussed regular exercise program and prevention of cardiovascular disease, including stroke.   -Discussed healthy lifestyle, including: healthy diet (rich in fruits, vegetables, nuts and healthy oils); proper hydration, and adequate sleep hygiene (allowing 7-8 hrs of overnight sleep).        Melissa Bloch, MD  Clinical  of Neurology [facility].   Diplomate in Neurology.   Office: [phone]  Fax: [phone]

## 2023-07-18 NOTE — ASSESSMENT & PLAN NOTE
Pt did well with his Ocrevus in 6/23. He has heat intolerance. Pt has muscle atrophy in his legs and calve muscles.  Pt has a positive mindset. Pt listens to podcasts. Pt has a 25 ftw test of 7.34seconds. Pt gets a little more motion sick that he used to get. Pt had a tooth infection after he cracked a tooth and he got an abscess. Pt took amoxicillin and he got better. Pt is working and keeping in stretching.

## 2023-07-19 LAB
GAMMA INTERFERON BACKGROUND BLD IA-ACNC: 0.03 IU/ML
M TB IFN-G BLD-IMP: NEGATIVE
M TB IFN-G CD4+ BCKGRND COR BLD-ACNC: 0.03 IU/ML
MITOGEN IGNF BCKGRD COR BLD-ACNC: >10 IU/ML
QFT TB2 - NIL TBQ2: 0 IU/ML

## 2023-07-20 LAB
IGA SERPL-MCNC: 132 MG/DL (ref 68–408)
IGG SERPL-MCNC: 612 MG/DL (ref 768–1632)
IGM SERPL-MCNC: 61 MG/DL (ref 35–263)

## 2023-09-05 DIAGNOSIS — G35 MULTIPLE SCLEROSIS (HCC): ICD-10-CM

## 2023-12-08 ENCOUNTER — OUTPATIENT INFUSION SERVICES (OUTPATIENT)
Dept: ONCOLOGY | Facility: MEDICAL CENTER | Age: 53
End: 2023-12-08
Attending: REGISTERED NURSE
Payer: MEDICARE

## 2023-12-08 VITALS
WEIGHT: 169.09 LBS | OXYGEN SATURATION: 97 % | RESPIRATION RATE: 18 BRPM | BODY MASS INDEX: 22.9 KG/M2 | SYSTOLIC BLOOD PRESSURE: 129 MMHG | HEART RATE: 71 BPM | TEMPERATURE: 98 F | HEIGHT: 72 IN | DIASTOLIC BLOOD PRESSURE: 87 MMHG

## 2023-12-08 DIAGNOSIS — G35 MULTIPLE SCLEROSIS (HCC): ICD-10-CM

## 2023-12-08 LAB
HBV CORE AB SERPL QL IA: NONREACTIVE
HBV SURFACE AG SER QL: NORMAL

## 2023-12-08 PROCEDURE — 86704 HEP B CORE ANTIBODY TOTAL: CPT

## 2023-12-08 PROCEDURE — 700105 HCHG RX REV CODE 258: Performed by: PSYCHIATRY & NEUROLOGY

## 2023-12-08 PROCEDURE — 96413 CHEMO IV INFUSION 1 HR: CPT

## 2023-12-08 PROCEDURE — 700105 HCHG RX REV CODE 258

## 2023-12-08 PROCEDURE — 96415 CHEMO IV INFUSION ADDL HR: CPT

## 2023-12-08 PROCEDURE — 700111 HCHG RX REV CODE 636 W/ 250 OVERRIDE (IP)

## 2023-12-08 PROCEDURE — 96375 TX/PRO/DX INJ NEW DRUG ADDON: CPT

## 2023-12-08 PROCEDURE — 700111 HCHG RX REV CODE 636 W/ 250 OVERRIDE (IP): Mod: JZ,JG | Performed by: PSYCHIATRY & NEUROLOGY

## 2023-12-08 PROCEDURE — A9270 NON-COVERED ITEM OR SERVICE: HCPCS | Performed by: PSYCHIATRY & NEUROLOGY

## 2023-12-08 PROCEDURE — 87340 HEPATITIS B SURFACE AG IA: CPT

## 2023-12-08 PROCEDURE — 700102 HCHG RX REV CODE 250 W/ 637 OVERRIDE(OP): Performed by: PSYCHIATRY & NEUROLOGY

## 2023-12-08 RX ORDER — ACETAMINOPHEN 325 MG/1
650 TABLET ORAL ONCE
OUTPATIENT
Start: 2024-06-02

## 2023-12-08 RX ORDER — 0.9 % SODIUM CHLORIDE 0.9 %
10 VIAL (ML) INJECTION PRN
OUTPATIENT
Start: 2024-06-02

## 2023-12-08 RX ORDER — HEPARIN SODIUM (PORCINE) LOCK FLUSH IV SOLN 100 UNIT/ML 100 UNIT/ML
500 SOLUTION INTRAVENOUS PRN
OUTPATIENT
Start: 2024-06-02

## 2023-12-08 RX ORDER — METHYLPREDNISOLONE SODIUM SUCCINATE 125 MG/2ML
125 INJECTION, POWDER, LYOPHILIZED, FOR SOLUTION INTRAMUSCULAR; INTRAVENOUS PRN
OUTPATIENT
Start: 2024-06-02

## 2023-12-08 RX ORDER — METHYLPREDNISOLONE SODIUM SUCCINATE 125 MG/2ML
100 INJECTION, POWDER, LYOPHILIZED, FOR SOLUTION INTRAMUSCULAR; INTRAVENOUS ONCE
Status: COMPLETED | OUTPATIENT
Start: 2023-12-08 | End: 2023-12-08

## 2023-12-08 RX ORDER — SODIUM CHLORIDE 9 MG/ML
INJECTION, SOLUTION INTRAVENOUS CONTINUOUS
OUTPATIENT
Start: 2024-06-02

## 2023-12-08 RX ORDER — ONDANSETRON 4 MG/1
4 TABLET, ORALLY DISINTEGRATING ORAL EVERY 4 HOURS PRN
OUTPATIENT
Start: 2024-06-02

## 2023-12-08 RX ORDER — DIPHENHYDRAMINE HCL 25 MG
50 TABLET ORAL ONCE
Status: COMPLETED | OUTPATIENT
Start: 2023-12-08 | End: 2023-12-08

## 2023-12-08 RX ORDER — 0.9 % SODIUM CHLORIDE 0.9 %
3 VIAL (ML) INJECTION PRN
OUTPATIENT
Start: 2024-06-02

## 2023-12-08 RX ORDER — DIPHENHYDRAMINE HCL 25 MG
50 TABLET ORAL ONCE
OUTPATIENT
Start: 2024-06-02 | End: 2024-06-02

## 2023-12-08 RX ORDER — EPINEPHRINE 1 MG/ML(1)
0.5 AMPUL (ML) INJECTION PRN
OUTPATIENT
Start: 2024-06-02

## 2023-12-08 RX ORDER — METHYLPREDNISOLONE SODIUM SUCCINATE 125 MG/2ML
100 INJECTION, POWDER, LYOPHILIZED, FOR SOLUTION INTRAMUSCULAR; INTRAVENOUS ONCE
OUTPATIENT
Start: 2024-06-02

## 2023-12-08 RX ORDER — ACETAMINOPHEN 325 MG/1
650 TABLET ORAL ONCE
Status: COMPLETED | OUTPATIENT
Start: 2023-12-08 | End: 2023-12-08

## 2023-12-08 RX ORDER — ONDANSETRON 2 MG/ML
8 INJECTION INTRAMUSCULAR; INTRAVENOUS EVERY 4 HOURS PRN
OUTPATIENT
Start: 2024-06-02

## 2023-12-08 RX ORDER — 0.9 % SODIUM CHLORIDE 0.9 %
VIAL (ML) INJECTION PRN
OUTPATIENT
Start: 2024-06-02

## 2023-12-08 RX ORDER — DIPHENHYDRAMINE HYDROCHLORIDE 50 MG/ML
50 INJECTION INTRAMUSCULAR; INTRAVENOUS PRN
OUTPATIENT
Start: 2024-06-02

## 2023-12-08 RX ADMIN — DIPHENHYDRAMINE HYDROCHLORIDE 50 MG: 25 TABLET ORAL at 10:17

## 2023-12-08 RX ADMIN — ACETAMINOPHEN 650 MG: 325 TABLET ORAL at 10:17

## 2023-12-08 RX ADMIN — OCRELIZUMAB 600 MG: 300 INJECTION INTRAVENOUS at 10:35

## 2023-12-08 RX ADMIN — METHYLPREDNISOLONE SODIUM SUCCINATE 100 MG: 125 INJECTION, POWDER, FOR SOLUTION INTRAMUSCULAR; INTRAVENOUS at 10:17

## 2023-12-08 ASSESSMENT — FIBROSIS 4 INDEX: FIB4 SCORE: 0.87

## 2023-12-08 NOTE — PROGRESS NOTES
Pt arrives to [facility] for Ocrevus infusion for MS.  Pt denies s/s of infection or worsening of MS symptoms.  PIV established to L-FA and Hepatitis B labs drawn as ordered by pharmacy.   Pre-medications given.  Ocrevus infused per pharmacy titration rates.  Max rate of 300 ml/hr.  Ocrevus completed.  One hour observation completed without adverse reaction.  PIV flushed with NS and site removed.  Site wrapped with pressure dressing.  Email sent to scheduling dept to set up next appt in 6 months.  Pt will look up next appt time in my chart account.  Pt dc home to self care.

## 2024-01-17 DIAGNOSIS — G35 MULTIPLE SCLEROSIS (HCC): ICD-10-CM

## 2024-01-17 RX ORDER — GABAPENTIN 300 MG/1
300 CAPSULE ORAL 3 TIMES DAILY
Qty: 270 CAPSULE | Refills: 3 | Status: SHIPPED | OUTPATIENT
Start: 2024-01-17

## 2024-01-17 NOTE — TELEPHONE ENCOUNTER
Received request via: Pharmacy    Medication Name/Dosage gabapentin (NEURONTIN) 300 MG Cap     When was medication last prescribed 07/18/23    How many refills were previously provided 3    How many Refills does he patient have left from last prescription 0    Was the patient seen in the last year in this department? Yes   Date of last office visit 07/18/23     Per last Neurology Office Visit, when was the date of next follow up visit set for?                            Date of office visit follow up request 6 months      Does the patient have an upcoming appointment? No   If yes, when will call PT to schedule              If no, schedule appointment Will call PT to schedule     Does the patient have USP Plus and need 100 day supply (blood pressure, diabetes and cholesterol meds only)? Patient does not have SCP

## 2024-01-19 DIAGNOSIS — G35 MULTIPLE SCLEROSIS (HCC): ICD-10-CM

## 2024-01-19 RX ORDER — DALFAMPRIDINE 10 MG/1
1 TABLET, FILM COATED, EXTENDED RELEASE ORAL EVERY 12 HOURS
Qty: 60 TABLET | Refills: 11 | Status: SHIPPED | OUTPATIENT
Start: 2024-01-19

## 2024-01-19 NOTE — TELEPHONE ENCOUNTER
Received request via: Pharmacy    Medication Name/Dosage AMPYRA 10 MG TABLET SR 12 HR     When was medication last prescribed 06/07/23    How many refills were previously provided 0    How many Refills does he patient have left from last prescription 0    Was the patient seen in the last year in this department? Yes   Date of last office visit 07/18/23    Per last Neurology Office Visit, when was the date of next follow up visit set for?                            Date of office visit follow up request 6 months      Does the patient have an upcoming appointment? No   If yes, when Will call pt to schedule              If no, schedule appointment N/A     Does the patient have half-way Plus and need 100 day supply (blood pressure, diabetes and cholesterol meds only)? Patient does not have SCP

## 2024-04-29 ENCOUNTER — TELEPHONE (OUTPATIENT)
Dept: NEUROLOGY | Facility: MEDICAL CENTER | Age: 54
End: 2024-04-29
Payer: MEDICARE

## 2024-04-29 NOTE — TELEPHONE ENCOUNTER
Patient needs an new RX sent [facility] [place] - [address] [facility] [[id number]]     As they have switched insurances and this is the new pharmacy

## 2024-05-07 ENCOUNTER — APPOINTMENT (OUTPATIENT)
Dept: NEUROLOGY | Facility: MEDICAL CENTER | Age: 54
End: 2024-05-07
Attending: PSYCHIATRY & NEUROLOGY
Payer: MEDICARE

## 2024-05-10 DIAGNOSIS — G35 MULTIPLE SCLEROSIS (HCC): ICD-10-CM

## 2024-05-10 RX ORDER — DALFAMPRIDINE 10 MG/1
1 TABLET, FILM COATED, EXTENDED RELEASE ORAL EVERY 12 HOURS
Qty: 60 TABLET | Refills: 11 | Status: SHIPPED | OUTPATIENT
Start: 2024-05-10

## 2024-06-10 ENCOUNTER — APPOINTMENT (OUTPATIENT)
Dept: ONCOLOGY | Facility: MEDICAL CENTER | Age: 54
End: 2024-06-10
Attending: PSYCHIATRY & NEUROLOGY
Payer: MEDICARE

## 2024-06-20 DIAGNOSIS — R25.2 SPASTICITY: ICD-10-CM

## 2024-06-20 DIAGNOSIS — G35 MULTIPLE SCLEROSIS (HCC): ICD-10-CM

## 2024-06-20 RX ORDER — BACLOFEN 20 MG/1
20 TABLET ORAL 3 TIMES DAILY
Qty: 90 TABLET | Refills: 11 | Status: SHIPPED | OUTPATIENT
Start: 2024-06-20 | End: 2025-06-15

## 2025-01-14 ENCOUNTER — TELEPHONE (OUTPATIENT)
Dept: NEUROLOGY | Facility: MEDICAL CENTER | Age: 55
End: 2025-01-14
Payer: MEDICARE

## 2025-01-14 NOTE — TELEPHONE ENCOUNTER
Infusion care sent over fax with instructions to send recent encounter notes for infusion. Patient hasn't been seen since 7/18/23. Called patient and scheduled a follow up visit for 3/3/25. Patient will still have infusion in the mean time.

## 2025-03-03 ENCOUNTER — OFFICE VISIT (OUTPATIENT)
Dept: NEUROLOGY | Facility: MEDICAL CENTER | Age: 55
End: 2025-03-03
Attending: PSYCHIATRY & NEUROLOGY
Payer: MEDICARE

## 2025-03-03 VITALS
HEART RATE: 87 BPM | OXYGEN SATURATION: 96 % | DIASTOLIC BLOOD PRESSURE: 74 MMHG | TEMPERATURE: 98.6 F | HEIGHT: 73 IN | BODY MASS INDEX: 23.7 KG/M2 | SYSTOLIC BLOOD PRESSURE: 118 MMHG | WEIGHT: 178.79 LBS

## 2025-03-03 DIAGNOSIS — M53.87 SCIATICA ASSOCIATED WITH DISORDER OF LUMBOSACRAL SPINE: ICD-10-CM

## 2025-03-03 DIAGNOSIS — G35 MULTIPLE SCLEROSIS (HCC): ICD-10-CM

## 2025-03-03 DIAGNOSIS — G43.009 MIGRAINE WITHOUT AURA AND WITHOUT STATUS MIGRAINOSUS, NOT INTRACTABLE: ICD-10-CM

## 2025-03-03 PROCEDURE — 99215 OFFICE O/P EST HI 40 MIN: CPT | Performed by: PSYCHIATRY & NEUROLOGY

## 2025-03-03 PROCEDURE — 3078F DIAST BP <80 MM HG: CPT | Performed by: PSYCHIATRY & NEUROLOGY

## 2025-03-03 PROCEDURE — 3074F SYST BP LT 130 MM HG: CPT | Performed by: PSYCHIATRY & NEUROLOGY

## 2025-03-03 PROCEDURE — 99212 OFFICE O/P EST SF 10 MIN: CPT | Performed by: PSYCHIATRY & NEUROLOGY

## 2025-03-03 RX ORDER — GABAPENTIN 300 MG/1
300 CAPSULE ORAL 3 TIMES DAILY
Qty: 270 CAPSULE | Refills: 3 | Status: SHIPPED | OUTPATIENT
Start: 2025-03-03

## 2025-03-03 ASSESSMENT — FIBROSIS 4 INDEX: FIB4 SCORE: 0.9

## 2025-03-03 ASSESSMENT — PATIENT HEALTH QUESTIONNAIRE - PHQ9
SUM OF ALL RESPONSES TO PHQ QUESTIONS 1-9: 14
CLINICAL INTERPRETATION OF PHQ2 SCORE: 2
5. POOR APPETITE OR OVEREATING: 1 - SEVERAL DAYS

## 2025-03-03 NOTE — ASSESSMENT & PLAN NOTE
Pt states that he has had some low back pain. Pt states that he has been on Ocrevus and he is doing ok with that. Pt states that he is getting his infusions at [facility]. Pt states that he has gotten some UTI and he is learning to use his cathers correctly. Pt states that he has gained a little weight. Patient uses the walker and he uses Norwegian crutches. He can go a distance a times but not always.

## 2025-03-04 NOTE — PROGRESS NOTES
Chief Complaint   Patient presents with    Follow-Up     Multiple sclerosis (HCC)       Problem List Items Addressed This Visit       Multiple sclerosis ([facility])    Pt states that he has had some low back pain. Pt states that he has been on Ocrevus and he is doing ok with that. Pt states that he is getting his infusions at [facility]. Pt states that he has gotten some UTI and he is learning to use his cathers correctly. Pt states that he has gained a little weight. Patient uses the walker and he uses [place] crutches. He can go a distance a times but not always.         Relevant Medications    gabapentin (NEURONTIN) 300 MG Cap    Other Relevant Orders    CBC WITH DIFFERENTIAL    Comp Metabolic Panel    IMMUNOGLOBULINS A/G/M SERUM    Sciatica associated with disorder of lumbosacral spine    Patient had recent x-rays which we reviewed today which did show decreased joint space at L5-S1.  Patient has sharp shooting pain radiating down posteriorly on his right leg to below the knee.  He has seen his primary care and did try a Medrol Dosepak which did not help significantly.  Patient has follow-up appointment in physical therapy.  We discussed the importance of core and abdominal strengthening and positioning to give him more comfort and take the pressure off the nerve root.         Relevant Medications    gabapentin (NEURONTIN) 300 MG Cap     Other Visit Diagnoses         Migraine without aura and without status migrainosus, not intractable        Relevant Medications    gabapentin (NEURONTIN) 300 MG Cap    Rimegepant Sulfate (NURTEC) 75 MG TABLET DISPERSIBLE            History of present illness:  Ross Lindsay 54 y.o. male presents today for full sclerosis who has been relatively stable on Ocrevus but has new complaints of right-sided sciatica and degenerative disc disease of the lumbosacral spine.  This increase in pain and sciatica has increased his weakness in his right leg.  Patient has seen primary  care in [place] and is going to start doing some physical therapy.    Past medical history:   Past Medical History:   Diagnosis Date    Acid reflux     Depression     MS (multiple sclerosis) (HCC)        Past surgical history:   History reviewed. No pertinent surgical history.    Family history:   Family History   Problem Relation Age of Onset    Cancer Mother         lung    Diabetes Mother     Heart Disease Mother     Hypertension Mother        Social history:   Social History     Socioeconomic History    Marital status: Single     Spouse name: Not on file    Number of children: Not on file    Years of education: Not on file    Highest education level: Associate degree: occupational, technical, or vocational program   Occupational History    Not on file   Tobacco Use    Smoking status: Former     Current packs/day: 0.50     Average packs/day: 0.5 packs/day for 1 year (0.5 ttl pk-yrs)     Types: Cigarettes    Smokeless tobacco: Never   Vaping Use    Vaping status: Never Used   Substance and Sexual Activity    Alcohol use: No     Alcohol/week: 0.0 oz     Comment: occasionally    Drug use: No    Sexual activity: Not on file   Other Topics Concern    Not on file   Social History Narrative    Not on file     Social Drivers of Health     Financial Resource Strain: Medium Risk (10/10/2022)    Overall Financial Resource Strain (CARDIA)     Difficulty of Paying Living Expenses: Somewhat hard   Food Insecurity: No Food Insecurity (10/10/2022)    Hunger Vital Sign     Worried About Running Out of Food in the Last Year: Never true     Ran Out of Food in the Last Year: Never true   Transportation Needs: No Transportation Needs (10/10/2022)    PRAPARE - Transportation     Lack of Transportation (Medical): No     Lack of Transportation (Non-Medical): No   Physical Activity: Sufficiently Active (10/10/2022)    Exercise Vital Sign     Days of Exercise per Week: 4 days     Minutes of Exercise per Session: 60 min   Stress:  Stress Concern Present (10/10/2022)    [employer] [place] [employer] - Occupational Stress Questionnaire     Feeling of Stress : Very much   Social Connections: Unknown (10/10/2022)    Social Connection and Isolation Panel [NHANES]     Frequency of Communication with Friends and Family: More than three times a week     Frequency of Social Gatherings with Friends and Family: Patient declined     Attends Hinduism Services: Patient declined     Active Member of Clubs or Organizations: Patient declined     Attends Club or Organization Meetings: Patient declined     Marital Status:    Intimate Partner Violence: Not on file   Housing Stability: Unknown (10/11/2024)    Housing Stability Vital Sign     Unable to Pay for Housing in the Last Year: Not on file     Number of Times Moved in the Last Year: Not on file     Homeless in the Last Year: No       Current medications:   Current Outpatient Medications   Medication    gabapentin (NEURONTIN) 300 MG Cap    Rimegepant Sulfate (NURTEC) 75 MG TABLET DISPERSIBLE    lisinopril (PRINIVIL) 10 MG Tab    baclofen (LIORESAL) 20 MG tablet    Dalfampridine (AMPYRA) 10 MG TABLET SR 12 HR    Cholecalciferol (VITAMIN D3) 3000 units Tab    Cyanocobalamin (VITAMIN B-12) 1000 MCG Tab    methylPREDNISolone (MEDROL DOSEPAK) 4 MG Tablet Therapy Pack     No current facility-administered medications for this visit.       Medication Allergy:  No Known Allergies    Review of systems:   Constitutional: denies fever, night sweats, weight loss.   Eyes: denies acute vision change, eye pain or secretion.   Ears, Nose, Mouth, Throat: denies nasal secretion, nasal bleeding, difficulty swallowing, hearing loss, tinnitus, vertigo, ear pain, acute dental problems, oral ulcers or lesions.   Endocrine: denies recent weight changes, heat or cold intolerance, polyuria, polydypsia, polyphagia,abnormal hair growth.  Cardiovascular: denies new onset of chest pain, palpitations, syncope, or  "dyspnea of exertion.  Pulmonary: denies shortness of breath, new onset of cough, hemoptysis, wheezing, chest pain or flu-like symptoms.   GI: denies nausea, vomiting, diarrhea, GI bleeding, change in appetite, abdominal pain, and change in bowel habits.  : denies dysuria, urinary incontinence, hematuria.  Heme/oncology: denies history of easy bruising or bleeding. No history of cancer, DVTor PE.  Allergy/immunology: denies hives/urticaria, or itching.   Dermatologic: denies new rash, or new skin lesions.  Musculoskeletal:denies joint swelling or pain, muscle pain, neck and back pain. Neurologic: denies headaches, acute visual changes, facial droopiness, muscle weakness (focal or generalized), paresthesias, anesthesia, ataxia, change in speech or language, memory loss, abnormal movements, seizures, loss of consciousness, or episodes of confusion.   Psychiatric: denies symptoms of depression, anxiety, hallucinations, mood swings or changes, suicidal or homicidal thoughts.     Physical examination:   Vitals:    03/03/25 1242   BP: 118/74   BP Location: Right arm   Patient Position: Sitting   BP Cuff Size: Adult   Pulse: 87   Temp: 37 °C (98.6 °F)   TempSrc: Temporal   SpO2: 96%   Weight: 81.1 kg (178 lb 12.7 oz)   Height: 1.854 m (6' 1\")     General: Patient in no acute distress, pleasant and cooperative.  HEENT: Normocephalic, no signs of acute trauma.   Neck: supple, no meningeal signs or carotid bruits. There is normal range of motion. No tenderness on exam.   Chest: clear to auscultation. No cough.   CV: RRR, no murmurs.   Skin: no signs of acute rashes or trauma.   Musculoskeletal: joints exhibit full range of motion, without any pain to palpation. There are no signs of joint or muscle swelling. There is no tenderness to deep palpation of muscles.   Psychiatric: No hallucinatory behavior. Denies symptoms of depression or suicidal ideation. Mood and affect appear normal on exam.     NEUROLOGICAL EXAM:   Mental " status, orientation: Awake, alert and fully oriented.   Speech and language: speech is clear and fluent. The patient is able to name, repeat and comprehend.   Memory: There is intact recollection of recent and remote events.   Cranial nerve exam: Pupils are 3-4 mm bilaterally and equally reactive to light and accommodation. Visual fields are intact by confrontation. Fundoscopic exam was unremarkable. There is no nystagmus on primary or secondary gaze. Intact full EOM in all directions of gaze. Face appears symmetric. Sensation in the face is intact to light touch. Uvula is midline. Palate elevates symmetrically. Tongue is midline and without any signs of tongue biting or fasciculations. Sternocleidomastoid muscles exhibit is normal strength bilaterally. Shoulder shrug is intact bilaterally.   Motor exam: Strength is 5/5 in all extremities. Tone is normal. No abnormal movements were seen on exam.   Sensory exam reveals normal sense of light touch, proprioception, vibration and pinprick in all extremities.   Deep tendon reflexes:  2+ throughout. Plantar responses are flexor. There is no clonus.   Coordination: shows a normal finger-nose-finger. Normal rapidly alternating movements.   Gait: The patient was able to get up from seated position on first attempt with requiring bilateral canes/wheeled walker assistance.  25 foot timed walk test was 10.4 seconds      ANCILLARY DATA REVIEWED:     Lab Data Review:  No results found for this or any previous visit (from the past 24 hours).    Records reviewed:       Imaging:       Details    Reading Physician Reading Date Result Priority   Lila Franco M.D.  [phone]     2/12/2025      Narrative & Impression        HISTORY/REASON FOR EXAM:  Pain Following Trauma; sciatica with radiculopathy after fall.     TECHNIQUE/EXAM DESCRIPTION AND NUMBER OF VIEWS:  Lumbar spine, 4 views.     COMPARISON: None.     FINDINGS:  Vertebral body height and alignment are maintained.   There are no acute fractures. No significant degenerative changes. Disc space narrowing and facet hypertrophy at L5-S1.  Small anterior osteophytes throughout.     IMPRESSION:     Degenerative changes at L5-S1.             ASSESSMENT AND PLAN:    1. Multiple sclerosis (HCC)  At this time is to continue with Ocrevus every 6 months as this has slowed his disability progression with his secondary progressive multiple sclerosis.  Patient states that he is trying to be active do stretching regularly.  In general patient tries to eat a healthy diet.  Patient needs some refills on his medications.  Prior to his next visit patient needs laboratory testing.  - gabapentin (NEURONTIN) 300 MG Cap; Take 1 Capsule by mouth 3 times a day.  Dispense: 270 Capsule; Refill: 3  - CBC WITH DIFFERENTIAL; Future  - Comp Metabolic Panel; Future  - IMMUNOGLOBULINS A/G/M SERUM; Future    2. Migraine without aura and without status migrainosus, not intractable  I will give him Nurtec samples as these helped him significantly.  - Rimegepant Sulfate (NURTEC) 75 MG TABLET DISPERSIBLE; Take 1 Tablet by mouth every 48 hours.  Dispense: 8 Tablet; Refill: 0    3. Sciatica associated with disorder of lumbosacral spine  We discussed conservative therapy including core strengthening, physical therapy and a waist belt for support.  We discussed positioning in relationship to his use of his canes and walker.        FOLLOW-UP:   Return in about 6 months (around 9/3/2025).      My total time spent caring for the patient on the day of the encounter was 47 minutes.   This does not include time spent on separately billable procedures/tests.     EDUCATION AND COUNSELING:  -Discussed regular exercise program and prevention of cardiovascular disease, including stroke.   -Discussed healthy lifestyle, including: healthy diet (rich in fruits, vegetables, nuts and healthy oils); proper hydration, and adequate sleep hygiene (allowing 7-8 hrs of overnight  sleep).        Melissa Bloch, MD  Clinical  of Neurology [facility].   Diplomate in Neurology.   Office: [phone]  Fax: [phone]

## 2025-03-04 NOTE — ASSESSMENT & PLAN NOTE
Patient had recent x-rays which we reviewed today which did show decreased joint space at L5-S1.  Patient has sharp shooting pain radiating down posteriorly on his right leg to below the knee.  He has seen his primary care and did try a Medrol Dosepak which did not help significantly.  Patient has follow-up appointment in physical therapy.  We discussed the importance of core and abdominal strengthening and positioning to give him more comfort and take the pressure off the nerve root.

## 2025-05-12 DIAGNOSIS — G35 MULTIPLE SCLEROSIS (HCC): ICD-10-CM

## 2025-05-12 RX ORDER — DALFAMPRIDINE 10 MG/1
1 TABLET, FILM COATED, EXTENDED RELEASE ORAL EVERY 12 HOURS
Qty: 60 TABLET | Refills: 9 | Status: SHIPPED | OUTPATIENT
Start: 2025-05-12

## 2025-05-13 ENCOUNTER — TELEPHONE (OUTPATIENT)
Dept: PHARMACY | Facility: MEDICAL CENTER | Age: 55
End: 2025-05-13
Payer: MEDICARE

## 2025-05-13 NOTE — TELEPHONE ENCOUNTER
Received [employer]  request via [employer]  for   Dalfampridine 10 MG TABLET SR 12 HR. (Quantity:60, Day Supply:30)     [facility]  Member ID:  [id number]  BIN: [id number]  PCN: [id number]  Group: [id number]     Ran Test claim via [place] & medication  RTC RTS - Will release to pt's preferred pharmacy on file. Pt has already declined services.

## 2025-06-05 DIAGNOSIS — R25.2 SPASTICITY: ICD-10-CM

## 2025-06-05 DIAGNOSIS — G35 MULTIPLE SCLEROSIS (HCC): ICD-10-CM

## 2025-06-09 RX ORDER — BACLOFEN 20 MG/1
20 TABLET ORAL 3 TIMES DAILY
Qty: 90 TABLET | Refills: 10 | Status: SHIPPED | OUTPATIENT
Start: 2025-06-09